# Patient Record
Sex: FEMALE | Race: WHITE | NOT HISPANIC OR LATINO | Employment: OTHER | URBAN - METROPOLITAN AREA
[De-identification: names, ages, dates, MRNs, and addresses within clinical notes are randomized per-mention and may not be internally consistent; named-entity substitution may affect disease eponyms.]

---

## 2017-03-28 ENCOUNTER — ALLSCRIPTS OFFICE VISIT (OUTPATIENT)
Dept: OTHER | Facility: OTHER | Age: 82
End: 2017-03-28

## 2017-03-28 DIAGNOSIS — I35.1 NONRHEUMATIC AORTIC VALVE INSUFFICIENCY: ICD-10-CM

## 2017-04-04 ENCOUNTER — HOSPITAL ENCOUNTER (OUTPATIENT)
Dept: NON INVASIVE DIAGNOSTICS | Facility: HOSPITAL | Age: 82
Discharge: HOME | End: 2017-04-04
Attending: INTERNAL MEDICINE
Payer: MEDICARE

## 2017-04-04 ENCOUNTER — HOSPITAL ENCOUNTER (OUTPATIENT)
Dept: RADIOLOGY | Facility: HOSPITAL | Age: 82
Discharge: HOME | End: 2017-04-04
Attending: INTERNAL MEDICINE
Payer: MEDICARE

## 2017-04-04 DIAGNOSIS — I35.1 NONRHEUMATIC AORTIC VALVE INSUFFICIENCY: ICD-10-CM

## 2017-04-04 PROCEDURE — 93005 ELECTROCARDIOGRAM TRACING: CPT

## 2017-04-04 PROCEDURE — 93306 TTE W/DOPPLER COMPLETE: CPT

## 2017-04-04 PROCEDURE — 93017 CV STRESS TEST TRACING ONLY: CPT

## 2017-04-04 PROCEDURE — 78452 HT MUSCLE IMAGE SPECT MULT: CPT

## 2017-04-04 PROCEDURE — A9502 TC99M TETROFOSMIN: HCPCS

## 2017-04-04 RX ORDER — ASPIRIN 81 MG/1
81 TABLET ORAL DAILY
COMMUNITY
End: 2019-01-29

## 2017-04-04 RX ORDER — MONTELUKAST SODIUM 10 MG/1
10 TABLET ORAL
COMMUNITY
End: 2018-09-14

## 2017-04-04 RX ORDER — PROPRANOLOL HYDROCHLORIDE 10 MG/1
10 TABLET ORAL 3 TIMES DAILY
COMMUNITY
End: 2018-05-22 | Stop reason: ALTCHOICE

## 2017-04-04 RX ORDER — LOSARTAN POTASSIUM 100 MG/1
100 TABLET ORAL DAILY
COMMUNITY

## 2017-04-04 RX ORDER — SIMVASTATIN 10 MG
10 TABLET ORAL
COMMUNITY

## 2017-04-04 RX ORDER — LEVOTHYROXINE SODIUM 88 UG/1
88 TABLET ORAL DAILY
COMMUNITY

## 2017-04-04 RX ADMIN — REGADENOSON 0.4 MG: 0.08 INJECTION, SOLUTION INTRAVENOUS at 09:59

## 2017-04-05 ENCOUNTER — GENERIC CONVERSION - ENCOUNTER (OUTPATIENT)
Dept: OTHER | Facility: OTHER | Age: 82
End: 2017-04-05

## 2017-04-07 LAB
ATRIAL RATE: 57 BPM
ATRIAL RATE: 73 BPM
ATRIAL RATE: 85 BPM
P AXIS: 64 DEGREES
P AXIS: 65 DEGREES
P AXIS: 69 DEGREES
PR INTERVAL: 172 MS
PR INTERVAL: 176 MS
PR INTERVAL: 180 MS
QRS AXIS: -5 DEGREES
QRS AXIS: 27 DEGREES
QRS AXIS: 41 DEGREES
QRSD INTERVAL: 68 MS
QRSD INTERVAL: 70 MS
QRSD INTERVAL: 70 MS
QT INTERVAL: 382 MS
QT INTERVAL: 392 MS
QT INTERVAL: 416 MS
QTC INTERVAL: 404 MS
QTC INTERVAL: 431 MS
QTC INTERVAL: 454 MS
T WAVE AXIS: 44 DEGREES
T WAVE AXIS: 48 DEGREES
T WAVE AXIS: 61 DEGREES
VENTRICULAR RATE: 57 BPM
VENTRICULAR RATE: 73 BPM
VENTRICULAR RATE: 85 BPM

## 2017-07-20 ENCOUNTER — ALLSCRIPTS OFFICE VISIT (OUTPATIENT)
Dept: OTHER | Facility: OTHER | Age: 82
End: 2017-07-20

## 2018-01-03 ENCOUNTER — ALLSCRIPTS OFFICE VISIT (OUTPATIENT)
Dept: OTHER | Facility: OTHER | Age: 83
End: 2018-01-03

## 2018-01-03 ENCOUNTER — TRANSCRIBE ORDERS (OUTPATIENT)
Dept: ADMINISTRATIVE | Facility: HOSPITAL | Age: 83
End: 2018-01-03

## 2018-01-03 DIAGNOSIS — I35.1 AORTIC VALVE INSUFFICIENCY, ETIOLOGY OF CARDIAC VALVE DISEASE UNSPECIFIED: ICD-10-CM

## 2018-01-03 DIAGNOSIS — R00.2 PALPITATIONS: ICD-10-CM

## 2018-01-03 DIAGNOSIS — I35.1 NONRHEUMATIC AORTIC VALVE INSUFFICIENCY: ICD-10-CM

## 2018-01-03 DIAGNOSIS — R00.2 PALPITATIONS: Primary | ICD-10-CM

## 2018-01-04 NOTE — PROGRESS NOTES
Assessment   Assessed    1  Moderate aortic regurgitation (424 1) (I35 1)   2  Dyslipidemia (272 4) (E78 5)   3  Benign essential hypertension (401 1) (I10)   4  Hypothyroidism (244 9) (E03 9)   5  Dyspnea on exertion (786 09) (R06 09)   6  Heart palpitations (785 1) (R00 2)   7  Sinus bradycardia (427 89) (R00 1)    Plan   Benign essential hypertension    · EKG/ECG- POC; Status:Complete;   Done: 22QVH2710   Perform: In Office; IRT:84OQV2768; Last Updated By:Jl Tatum; 1/3/2018 10:29:35 AM;Ordered; For:Benign essential hypertension; Ordered By:Chad Brar;  Benign essential hypertension, Dyslipidemia, Heart palpitations, Moderate aortic    regurgitation    · Follow-up visit in 4 Months Evaluation and Treatment  Follow-up  Status: Complete     Done: 37YKQ0296   Ordered; For: Benign essential hypertension, Dyslipidemia, Heart palpitations, Moderate aortic regurgitation; Ordered By: Toyin Romero Performed:  Due: 44XHF4502; Last Updated By: Shayna Barcenas; 1/3/2018 3:11:30 PM  Heart palpitations, Moderate aortic regurgitation    · HOLTER MONITOR - 48 HOUR; Status:Active; Requested NAK:[id number]; Perform:[facility]; RUF:[id number]; Last Updated By:Deisy Barrett; 1/3/2018 11:11:09 AM;Ordered;For:Heart palpitations, Moderate aortic regurgitation; Ordered By:Chad Brar; Moderate aortic regurgitation    · ECHO COMPLETE WITH CONTRAST IF INDICATED; Status:Need Information - Financial    Authorization; Requested GJQ:[id number]; Perform:[facility]; YUW:[id number]; Last Updated By:Deisy Barrett; 1/3/2018 11:11:09 AM;Ordered; For:Moderate aortic regurgitation; Ordered By:Chad Brar;    Discussion/Summary   Cardiology Discussion Summary Free Text Note Form [facility]:    1  Moderate aortic regurgitation with dilated LV on last echo  Echo this time repeat shows normal LV systolic function with moderate AI but no change from old ECHO  LV cavity appears to be upper limit of normal size  Will repeat echo Doppler Dyspnea on exertion  Not changed from old symptoms  Most likely is deconditioning in her age  Less likely to be cardiac in present in view of nonischemic nuclear and normal LV systolic function by echo  Dyslipidemia  Continue statins patient has been tolerating simvastatin at a low dose pretty well  Will continue that Hypertension  Blood pressures well controlled with current therapy  Hypothyroidism  On levothyroxine Occasional palpitations and sinus bradycardia  Patient has been on propranolol 20 mg twice a day  We will continue that as she is tolerating it very well  Patient now agree for Holter monitor  Will check 48 hour Holter monitor will follow up in 4 months  Further plan as also of Holter and echo becomes available  Continue same med medications  She is tolerating her statin also very well  Goals and Barriers: The patient has the current Goals: Wants to keep herself more active  The patent has the current Barriers: None other than arthritis  Patient's Capacity to Self-Care: Patient is able to Self-Care  Medication SE Review and Pt Understands Tx: Possible side effects of new medications were reviewed with the patient/guardian today  Counseling Documentation With Imm: The patient, patient's family was counseled regarding diagnostic results,-- instructions for management,-- risk factor reductions,-- prognosis,-- patient and family education,-- impressions,-- risks and benefits of treatment options,-- importance of compliance with treatment  Chief Complaint   Chief Complaint Free Text Note Form: PT is here for a 6 month F/U, no cardiac complaints at this time  Chief Complaint Chronic Condition [facility]: Patient is here today for follow up of chronic conditions described in HPI        History of Present Illness   Cardiology HPI Free Text Note Form [facility]: 42-year-old woman with a past medical history significant for hypertension, dyslipidemia, hypothyroidism, history of pulmonary embolism with DVT in 2010, breast cancer treated with partial mastectomy in 2003, hyperthyroidism, total abdominal hysterectomy, DJD who has been seeing gas for the last 3-4 years for hypertension  Her previous echo has shown moderate aortic insufficiency, she cannot walk on the treadmill  came for follow-up for her hypertension, dyslipidemia, hypothyroidism  She does have history of pulmonary embolism with DVT in 2010  She is doing well  Denies any chest pain or any shortness of breath  Her echo shows no significant change from old echo  Nuclear stress test was no ischemia  She has no PND no orthopnea and no other significant complaint  She occasionally gets palpitations  She denies any symptoms of dizziness lightheadedness  She feels she missed a beat  Reluctant to do any Holter monitor as she has it for many years and has been worked up in the past  reviewed  Patient came for follow-up for her moderate aortic regurgitation  She has multiple other medical problems  She is feeling well  Denies any chest pain or any shortness of breath  Workup on last echo shows her LV was upper limit of normal size with moderate AI  She remains bradycardic heart rate around 50 beats per minute  Denies any history of syncope  No dizziness no lightheadedness no other significant complaint  Review of Systems   Cardiology Female ROS:         Cardiac: rhythm problems-- and-- has heart murmur present, but-- as noted in HPI--   Dyspnea on exertion  Skin: No complaints of nonhealing sores or skin rash  Genitourinary: No complaints of recurrent urinary tract infections, frequent urination at night, difficult urination, blood in urine, kidney stones, loss of bladder control, kidney problems, denies any birth control or hormone replacement, is not post menopausal, not currently pregnant  Psychological: No complaints of feeling depressed, anxiety, panic attacks, or difficulty concentrating        General: No complaints of trouble sleeping, lack of energy, fatigue, appetite changes, weight changes, fever, frequent infections, or night sweats  Respiratory: No complaints of shortness of breath, cough with sputum, or wheezing  HEENT: No complaints of serious problems, hearing problems, nose problems, throat problems, or snoring  Gastrointestinal: No complaints of liver problems, nausea, vomiting, heartburn, constipation, bloody stools, diarrhea, problems swallowing, adbominal pain, or rectal bleeding  Hematologic: No complaints of bleeding disorders, anemia, blood clots, or excessive brusing  Neurological: No complaints of numbness, tingling, dizziness, weakness, seizures, headaches, syncope or fainting, AM fatigue, daytime sleepiness, no witnessed apnea episodes  Musculoskeletal: No complaints of arthritis, back pain, or painfull swelling  ROS Reviewed:    ROS reviewed  Active Problems   Problems    1  Benign essential hypertension (401 1) (I10)   2  Dyslipidemia (272 4) (E78 5)   3  Dyspnea on exertion (786 09) (R06 09)   4  Heart palpitations (785 1) (R00 2)   5  Hypothyroidism (244 9) (E03 9)   6  Leg swelling (729 81) (M79 89)   7  Moderate aortic regurgitation (424 1) (I35 1)   8  Spider varicose veins (454 9) (I86 8)    Past Medical History   Problems    1  History of malignant neoplasm of breast (V10 3) (Z85 3)  Active Problems And Past Medical History Reviewed: The active problems and past medical history were reviewed and updated today  Surgical History   Problems    1  History of Breast Surgery Lumpectomy   2  History of Hysterectomy   3  History of Knee Arthroscopy (Therapeutic)  Surgical History Reviewed: The surgical history was reviewed and updated today  Family History   Mother    1  Family history of malignant neoplasm (V16 9) (Z80 9)  Father    2  Family history of cardiac disorder (V17 49) (Z82 49)  Family History Reviewed:     The family history was reviewed and updated today  Social History   Problems    · Never a smoker   · No alcohol use   · No drug use  Social History Reviewed: The social history was reviewed and updated today  Current Meds    1  Adacel 5-2-15 5 LF-MCG/0 5 SUSP; Therapy: 07OWE3950 to Recorded   2  Aspirin 81 MG Oral Tablet Delayed Release; Take 1 tablet daily; Therapy: 63AZR6085 to (Last Rx:28Mar2017) Ordered   3  Clindamycin HCl - 150 MG Oral Capsule; Therapy: 19EUH9069 to Recorded   4  Ibuprofen 800 MG Oral Tablet; Therapy: 54GJP6500 to Recorded   5  Levothyroxine Sodium 88 MCG Oral Tablet; Therapy: 02Jun2016 to Recorded   6  Losartan Potassium 100 MG Oral Tablet; TAKE 1 TABLET DAILY; Therapy: 16QKE8364 to Recorded   7  Montelukast Sodium 10 MG Oral Tablet; Therapy: 02Jun2016 to Recorded   8  Ofloxacin 0 3 % Ophthalmic Solution; Therapy: 20Gdh5501 to Recorded   9  PrednisoLONE Acetate 1 % Ophthalmic Suspension; Therapy: 06Ysb7161 to Recorded   10  Propranolol HCl - 20 MG Oral Tablet; TAKE 1 TABLET TWICE DAILY; Therapy: 59UXS0982 to Recorded   11  Simvastatin 10 MG Oral Tablet; Therapy: 63VBK9643 to Recorded  Medication List Reviewed: The medication list was reviewed and updated today  Allergies   Medication    1  No Known Drug Allergies  Non-Medication    2  Adhesive Tape    Vitals   Vital Signs    Recorded: 22MUD4489 10:15AM   Heart Rate 51, Apical   Systolic 306, RUE, Sitting   Diastolic 76, RUE, Sitting   Height 5 ft 3 in   Weight 215 lb 6 oz   BMI Calculated 38 15   BSA Calculated 2   O2 Saturation 98, RA     Physical Exam        Constitutional      General appearance: No acute distress, well appearing and well nourished  -- Alert awake and  Eyes      Conjunctiva and Sclera examination: Conjunctiva pink, sclera anicteric         Ears, Nose, Mouth, and Throat - Oropharynx: Clear, nares are clear, mucous membranes are moist       Neck      Neck and thyroid: Normal, supple, trachea midline, no thyromegaly  -- No JVP  Pulmonary      Respiratory effort: No increased work of breathing or signs of respiratory distress  -- Normal effort  Auscultation of lungs: Clear to auscultation, no rales, no rhonchi, no wheezing, good air movement  -- Clear to auscultation  Cardiovascular      Auscultation of heart: Normal rate and rhythm, normal S1 and S2, no murmurs  -- S1-S2 regular bradycardic with a crescendo decrescendo diastolic murmur  [place] apical area  Carotid pulses: Normal, 2+ bilaterally  Peripheral vascular exam: Normal pulses throughout, no tenderness, erythema or swelling  Pedal pulses: Normal, 2+ bilaterally  Examination of extremities for edema and/or varicosities: Normal        Abdomen      Abdomen: Non-tender and no distention  Liver and spleen: No hepatomegaly or splenomegaly  Musculoskeletal Gait and station: Normal gait  -- Digits and nails: Normal without clubbing or cyanosis  -- Inspection/palpation of joints, bones, and muscles: Normal, ROM normal        Skin - Skin and subcutaneous tissue: Normal without rashes or lesions  Skin is warm and well perfused, normal turgor  Neurologic - Cranial nerves: II - XII intact  -- Speech: Normal        Psychiatric - Orientation to person, place, and time: Normal -- Mood and affect: Normal       Results/Data   Diagnostic Studies Reviewed Cardio: I personally reviewed the recording/images in the office today  My interpretation follows  Stress Test: Stress echocardiogram done in 2014 by a different cardiologist shows normal global function, no segment wall motion abnormality, EF was normal however the study was limited and patient could not get 85% maximum predicted heart rate due to beta blockade stress test done in April 2017 shows no evidence of ischemia   Mild fixed defect EF was normal       Echocardiogram/NIKIA: There is no recent echo, echo done in 2014 shows EF 57%, moderately dilated LV and moderately dilated LA, mild MR, mild-to-moderate aortic regurgitation Doppler done in April 2017 shows normal LV systolic function, grade 2 diastolic dysfunction, moderate AI, mild MR, moderately dilated left atrium, mild-to-moderate TR with PA pressure around 30 mmHg  ECG Report:      Comparison to prior ECGs: no interval change  Twelve-lead EKG done in April 2016 shows normal sinus rhythm heart rate 63 bpm  EKG done on 3/28/2017 shows normal sinus stump  Heart rate 57 bpm  Nonspecific ST changes  No change from old EKG lead EKG shows sinus bradycardia heart rate 51 beats per minute   No significant change from old EKG      Signatures    Electronically signed by : ERASTO Jolly ; Rob  3 2018  5:36PM EST                       (Author)

## 2018-01-09 NOTE — PROGRESS NOTES
Assessment    1  Leg swelling (729 81) (M79 89)   2  Spider varicose veins (454 9) (I86 8)    Plan    1  Gradient compression stocking, below knee, 20-30mm Hg, each; Status:Complete;     Done: 20ALE0679   2  Begin or continue regular aerobic exercise  Gradually work up to at least count1   sessions of dur1 of exercise a week ; Status:Complete;   Done: 87YWO5192   3  Keep your leg elevated whenever you can to decrease swelling and increase circulation ;   Status:Complete;   Done: 30TJO9589   4  Support stockings can help keep the blood from pooling in the small veins in your feet   and legs ; Status:Complete;   Done: 04DYP6671   5  We recommend that you bring your body mass index down to 26 ; Status:Complete;     Done: 02OLC3085    Discussion/Summary  Discussion Summary:   Bilateral lower extremity swelling with spider and reticular varicosities  We discussed the pathophysiology of venous disease and the indications for further evaluation treatment  We discussed the options of continued conservative management with exercise, elevation, compression and maintenance of a healthy weight  At this time continued conservative management will be pursued and if there is any progression further evaluation will be performed at that time which would include duplex evaluation for reflux  A new prescription for compressive stockings was given  Chief Complaint  Chief Complaint Free Text Note Form: " I need my varicose veins evaluated " No testing  Gregorio Morris is new to our practice, She was referred by Dr Gallito Segura  History of Present Illness  Free Text HPI: 53-year-old presents with varicose veins affecting bilateral lower extremities  She is noticed varicose veins for the past 40 years but notes they have become more prominent recently  She denies any specific discomfort at the site of varicosities but does note heaviness and fatigue sensation in her legs after standing for prolonged periods of time   This is relieved with elevation  She does not currently utilize compressive stockings  She feels they make the discomfort worse  She does have a history of DVT 5-6 years ago with pulmonary embolus wall on tamoxifen for treatment of breast cancer  She was treated with anticoagulation for one year  She has had no recurrence or previous episodes of superficial or deep vein thrombosis  She is here today to have her varicose veins evaluated  She states that she has bilateral varicose veins & spider veins, she denies any pain , tenderness, numbness & tingling  She states that after being on her feet all day they feel very heavy & her ankles will swell  She does not wear compression stocking  She has a history of L DVT & PE in 2010 she was on warfarin for a year  She does take a baby aspirin daily  Review of Systems  Complete Female - Vasc:   Constitutional: No fever or chills, feels well, no tiredness, no recent weight gain or weight loss  Eyes: No sudden vision loss, no blurred vision, no double vision  ENT: hoarseness, but no earache, no nosebleeds, no sore throat, no hearing loss and no nasal discharge  Cardiovascular: no chest pain, regular heart rate  Respiratory: no wheezing, no cough, no orthopnea and no complaints of PND, but shortness of breath and shortness of breath during exertion  Gastrointestinal: No nausea, No vomiting, no diarrhea, no blood in stool  Genitourinary: no dysuria, no Hematuria,no urinary incontinence  Musculoskeletal: joint stiffness, but lumbar pain, no joint swelling, no limb pain, no myalgias and no limb swelling  Integumentary: no rash, no lesions, no wounds, no ulcer  Neurological: no dementia and difficulty walking, but no headache, no numbness, no confusion, no dizziness, no limb weakness, no convulsions and no fainting  Psychiatric: no depression, no mood disorders, no anxiety  Hematologic/Lymphatic: no bleeding disorder, no easy bruising  ROS Reviewed:   ROS reviewed  Past Medical History    1  History of malignant neoplasm of breast (V10 3) (Z85 3)  Active Problems And Past Medical History Reviewed: The active problems and past medical history were reviewed and updated today  Surgical History  Surgical History Reviewed: The surgical history was reviewed and updated today  Family History  Family History Reviewed: The family history was reviewed and updated today  Social History    · Never a smoker   · No alcohol use   · No drug use  Social History Reviewed: The social history was reviewed and updated today  Current Meds  Medication List Reviewed: The medication list was reviewed and updated today  Allergies    1  No Known Drug Allergies    2  Adhesive Tape    Vitals  Vital Signs [Data Includes: Current Encounter]    Recorded: 42Eqb4706 11:13AM   Heart Rate 70, R Radial   Pulse Quality Normal, R Radial   Respiration 16   Respiration Quality Normal   Systolic 318, RUE, Sitting   Diastolic 68, RUE, Sitting   Height 5 ft 3 in   Weight 207 lb    BMI Calculated 36 67   BSA Calculated 1 96     Physical Exam    Radial: right 2+ and left 2+  Dorsalis pedis: right 2+ and left 2+  Distal Pulse Exam: Normal Capillary Refill  Extremities: bilateral ankle pitting edema, bilateral pretibial pitting edema and no foot edema  LE Varicose Veins: no right leg truncal veins, no left leg truncal veins, right leg reticular veins, left leg reticular veins, right leg spider veins and left leg spider veins  The heart rate was normal  The rhythm was regular  Murmurs: No murmurs were heard  Pulmonary   Respiratory effort: No increased work of breathing or signs of respiratory distress  Auscultation of lungs: Clear to auscultation  No wheezing, no rales, no rhonchi  Psychiatric   Orientation to person, place and time: Normal    Mood and affect: Normal    Neurologic Sensory exam normal   Motor skills intact     Musculoskeletal   Gait and station: Normal  Skin   Skin and subcutaneous tissue: Normal without rashes or lesions  Palpation of skin and subcutaneous tissue: Normal turgor  Venous Disease: Spider and reticular varicosities throughout bilateral lower extremities with a cluster in the popliteal fossa        Signatures   Electronically signed by : Emanuel Belle MD; Jul 14 2016 11:54AM EST                       (Author)

## 2018-01-09 NOTE — RESULT NOTES
Message   Echo is okay  Valve leak is not worse  Verified Results  ECHO COMPLETE WITH CONTRAST IF INDICATED 2017 07:44AM Aleksandar Carranza Order Number: [id number]    - Patient Instructions: To schedule this appointment, please contact Central Scheduling at [phone]  Test Name Result Flag Reference   ECHO COMPLETE WITH CONTRAST IF INDICATED (Report)     [address]   [address] [facility]   [place] [address]   [phone]     Transthoracic Echocardiogram   2D, M-mode, Doppler, and Color Doppler     Study date: 2017     Patient: Robert Avendano   MR number: [id number]   Account number: [de-identified]   : 1935   Age: 80 years   Gender: Female   Status: Routine   Location: Echo lab   Height: 63 in   Weight: 213 6 lb   BP: 126/ 74 mmHg     Indications: Aortic Valve Disease     Diagnoses: 424 1 - AORTIC VALVE DISORDER     Sonographer: Bucky Olivares   Primary Physician: Hailey Contreras MD   Referring Physician: Gaston Robertson MD   Group: [facility]   Interpreting Physician: DO MUSHTAQ Castro     LEFT VENTRICLE:   Systolic function was normal by visual assessment  Ejection fraction was estimated in the range of 55 % to 60 %  There were no regional wall motion abnormalities  There was mild concentric hypertrophy  Features were consistent with a pseudonormal left ventricular filling pattern, with concomitant abnormal relaxation and increased filling pressure (grade 2 diastolic dysfunction)  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  LEFT ATRIUM:   The atrium was moderately dilated  MITRAL VALVE:   There was mild regurgitation  AORTIC VALVE:   There was no evidence for stenosis  There was moderate regurgitation  TRICUSPID VALVE:   There was moderate regurgitation  Pulmonary artery systolic pressure was within the normal range  Estimated peak PA pressure was 26 mmHg       HISTORY: PRIOR HISTORY: HTN, Dyslipidemia, Hypothyroidism     PROCEDURE: The procedure was performed in the echo lab  This was a routine study  The transthoracic approach was used  The study included complete 2D imaging, M-mode, complete spectral Doppler, and color Doppler  The heart rate was 60 bpm,   at the start of the study  Image quality was adequate  LEFT VENTRICLE: Size was normal  Systolic function was normal by visual assessment  Ejection fraction was estimated in the range of 55 % to 60 %  There were no regional wall motion abnormalities  There was mild concentric hypertrophy  DOPPLER: Features were consistent with a pseudonormal left ventricular filling pattern, with concomitant abnormal relaxation and increased filling pressure (grade 2 diastolic dysfunction)  Findings were consistent with constrictive   pericarditis  There was no evidence of elevated ventricular filling pressure by Doppler parameters  RIGHT VENTRICLE: The size was normal  Systolic function was normal  DOPPLER: Systolic pressure was within the normal range  LEFT ATRIUM: The atrium was moderately dilated  RIGHT ATRIUM: Size was at the upper limits of normal      MITRAL VALVE: Valve structure was normal  There was normal leaflet separation  No echocardiographic evidence for prolapse  DOPPLER: The transmitral velocity was within the normal range  There was no evidence for stenosis  There was mild   regurgitation  AORTIC VALVE: The valve was trileaflet  Leaflets exhibited normal cuspal separation and sclerosis  DOPPLER: Transaortic velocity was within the normal range  There was no evidence for stenosis  There was moderate regurgitation  TRICUSPID VALVE: The valve structure was normal  There was normal leaflet separation  DOPPLER: The transtricuspid velocity was within the normal range  There was moderate regurgitation  Pulmonary artery systolic pressure was within the   normal range  Estimated peak PA pressure was 26 mmHg       PULMONIC VALVE: Leaflets exhibited normal thickness, no calcification, and normal cuspal separation  DOPPLER: The transpulmonic velocity was within the normal range  There was mild regurgitation  PERICARDIUM: There was no thickening  There was no pericardial effusion  AORTA: The root exhibited normal size  PULMONARY ARTERY: The size was normal  The morphology appeared normal      SYSTEM MEASUREMENT TABLES     2D mode   AoR Diam 2D: 3 cm   LA Diam (2D): 4 5 cm   LA/Ao (2D): 1 5   FS (2D Teich): 33 1 %   IVSd (2D): 1 21 cm   LVDEV: 103 cmï¾³   LVESV: 39 4 cmï¾³   LVIDd(2D): 4 71 cm   LVISd (2D): 3 15 cm   LVPWd (2D): 1 31 cm   SV (Teich): 63 6 cmï¾³     Apical four chamber   LVEF A4C: 62 %     Unspecified Scan Mode   MV Peak A [place]: 316 mm/s   MV Peak E [place]  Mean: 871 mm/s   MVA (PHT): 3 28 cm squared   PHT: 67 ms   Max P mm[Hg]   V Max: 2170 mm/s   Vmax: 2150 mm/s   RA Area: 17 8 cm squared   RA Volume: 44 8 cmï¾³   TAPSE: 2 1 cm     [facility] Accredited [facility]     Prepared and electronically signed by     Evi Moore DO   Signed 2017 16:31:50     Addendum Date: 2017 11:49:19 AM   There are no findings to suggest constrictive pericardititis  There was an error in previous report  Please disregard       Addendum entered by: Evi Moore DO

## 2018-01-13 VITALS
DIASTOLIC BLOOD PRESSURE: 70 MMHG | HEIGHT: 63 IN | HEART RATE: 74 BPM | WEIGHT: 213 LBS | BODY MASS INDEX: 37.74 KG/M2 | SYSTOLIC BLOOD PRESSURE: 126 MMHG | OXYGEN SATURATION: 96 %

## 2018-01-13 VITALS
SYSTOLIC BLOOD PRESSURE: 126 MMHG | DIASTOLIC BLOOD PRESSURE: 74 MMHG | BODY MASS INDEX: 37.92 KG/M2 | HEART RATE: 57 BPM | HEIGHT: 63 IN | WEIGHT: 214 LBS

## 2018-01-22 VITALS
SYSTOLIC BLOOD PRESSURE: 122 MMHG | WEIGHT: 215.38 LBS | BODY MASS INDEX: 38.16 KG/M2 | HEIGHT: 63 IN | DIASTOLIC BLOOD PRESSURE: 76 MMHG | HEART RATE: 51 BPM | OXYGEN SATURATION: 98 %

## 2018-02-09 ENCOUNTER — HOSPITAL ENCOUNTER (OUTPATIENT)
Dept: NON INVASIVE DIAGNOSTICS | Facility: HOSPITAL | Age: 83
Discharge: HOME | End: 2018-02-09
Attending: INTERNAL MEDICINE
Payer: MEDICARE

## 2018-02-09 DIAGNOSIS — I35.1 AORTIC VALVE INSUFFICIENCY, ETIOLOGY OF CARDIAC VALVE DISEASE UNSPECIFIED: ICD-10-CM

## 2018-02-09 DIAGNOSIS — I35.1 NONRHEUMATIC AORTIC VALVE INSUFFICIENCY: ICD-10-CM

## 2018-02-09 DIAGNOSIS — R00.2 PALPITATIONS: ICD-10-CM

## 2018-02-09 PROCEDURE — 93306 TTE W/DOPPLER COMPLETE: CPT

## 2018-02-09 PROCEDURE — 93225 XTRNL ECG REC<48 HRS REC: CPT

## 2018-02-09 PROCEDURE — 93226 XTRNL ECG REC<48 HR SCAN A/R: CPT

## 2018-02-10 PROCEDURE — 93306 TTE W/DOPPLER COMPLETE: CPT | Performed by: INTERNAL MEDICINE

## 2018-02-11 NOTE — PROGRESS NOTES
Patient's echo shows normal LV systolic function  Mild-to-moderate AI, mild-to-moderate MR  Above EF to keep an eye on it    Nothing acutely need to be done a he and

## 2018-02-16 PROCEDURE — 93227 XTRNL ECG REC<48 HR R&I: CPT | Performed by: INTERNAL MEDICINE

## 2018-05-19 PROBLEM — E03.9 HYPOTHYROIDISM: Status: ACTIVE | Noted: 2017-03-28

## 2018-05-19 PROBLEM — R00.2 HEART PALPITATIONS: Status: ACTIVE | Noted: 2017-07-20

## 2018-05-19 PROBLEM — R00.1 SINUS BRADYCARDIA: Status: ACTIVE | Noted: 2018-01-03

## 2018-05-19 PROBLEM — R06.00 DYSPNEA ON EXERTION: Status: ACTIVE | Noted: 2017-03-28

## 2018-05-19 PROBLEM — I35.1 MODERATE AORTIC REGURGITATION: Status: ACTIVE | Noted: 2017-03-28

## 2018-05-19 PROBLEM — E78.5 DYSLIPIDEMIA: Status: ACTIVE | Noted: 2017-03-28

## 2018-05-19 PROBLEM — I10 BENIGN ESSENTIAL HYPERTENSION: Status: ACTIVE | Noted: 2017-03-28

## 2018-05-19 NOTE — PROGRESS NOTES
Progress Note - Cardiology Office  John Emanuel [de-identified] y o  female MRN: [id number]  : 1935  Encounter: [id number]      Assessment:     1  Moderate aortic regurgitation    2  Sinus bradycardia    3  Benign essential hypertension    4  Hypothyroidism due to acquired atrophy of thyroid    5  Dyslipidemia    6  Dyspnea on exertion    7  Heart palpitations        Discussion Summary and Plan:  1  Moderate aortic regurgitation with dilated LV on last echo  Echo this time repeat shows normal LV systolic function with moderate AI but no change from old ECHO  LV cavity appears to be upper limit of normal size  Repeat echo from 2018 reviewed with the patient  No significant change  Continue to monitor  2  Dyspnea on exertion  Not changed from old symptoms  Most likely is deconditioning in her age  Less likely to be cardiac in present in view of nonischemic nuclear and normal LV systolic function by echo  Advised to lose weight  Patient is on chronic beta-blocker with slow heart rate may be cause of has some fatigue but she does not want to stop taking it as    3  Dyslipidemia  Continue statins patient has been tolerating simvastatin at a low dose pretty well  Will continue that     4  Hypertension  Blood pressures well controlled with current therapy  5  Hypothyroidism  On levothyroxine     6  Sinus bradycardia  Patient's sinus bradycardia is made worse with propanolol  She is taking it for tremors  She does not function without it  At this time we will continue  Her average heart rate is around 62 beats per minute  Counseling :  A description of the counseling  As above  Patient's ability to self care: Yes  Medication side effect reviewed with patient in detail and all their questions answered to their satisfaction  HPI :     John Emanuel is a [de-identified]y o  year old female who came for follow up   Patient has a past medical history significant for hypertension, dyslipidemia, hypothyroidism, history of pulmonary embolism with DVT in 2010, breast cancer treated with partial mastectomy in 2003, hyperthyroidism, total abdominal hysterectomy, DJD who has been seeing gas for the last 3-4 years for hypertension  Her previous echo has shown moderate aortic insufficiency, she cannot walk on the treadmill    /20/2017 came for follow-up for her hypertension, dyslipidemia, hypothyroidism  She does have history of pulmonary embolism with DVT in 2010  She is doing well  Denies any chest pain or any shortness of breath  Her echo shows no significant change from old echo  Nuclear stress test was no ischemia  She has no PND no orthopnea and no other significant complaint  She occasionally gets palpitations  She denies any symptoms of dizziness lightheadedness  She feels she missed a beat  Reluctant to do any Holter monitor as she has it for many years and has been worked up in the past    /03/2018 reviewed  Patient came for follow-up for her moderate aortic regurgitation  She has multiple other medical problems  She is feeling well  Denies any chest pain or any shortness of breath  Workup on last echo shows her LV was upper limit of normal size with moderate AI  She remains bradycardic heart rate around 50 beats per minute  Denies any history of syncope  No dizziness no lightheadedness no other significant complaint  05/22/2018  Above reviewed  Patient complaining about fatigue and tiredness and shortness of breath  But this is almost same as before  She does have history of tremors and she uses propanolol  Currently her heart rate is 50 beats per minute  She had a 48 hr Holter where average heart rate was around 60 beats per minute  She denies any chest pain  She has a cardiac workup done which included stress test and echo last year  Echo shows mild-to-moderate AI mild-to-moderate MR with normal LV systolic function and grade 2 diastolic dysfunction  No leg swelling, no PND no orthopnea no chest pain  Over winter she has gained about 3-4 lb but no other significant change  Review of Systems   Constitutional: Positive for fatigue  Respiratory: Positive for shortness of breath  Exertional and not changed   Musculoskeletal: Positive for gait problem  All other systems reviewed and are negative  Historical Information   No past medical history on file  No past surgical history on file  History   Alcohol use Not on file     History   Drug use: Unknown     History   Smoking Status    Never Smoker   Smokeless Tobacco    Never Used     Family History: No family history on file  Meds/Allergies     Allergies   Allergen Reactions    Tape  [Medical Tape]        Current Outpatient Prescriptions:     aspirin (ECOTRIN LOW STRENGTH) 81 mg EC tablet, Take 81 mg by mouth daily, Disp: , Rfl:     levothyroxine 88 mcg tablet, Take 88 mcg by mouth daily, Disp: , Rfl:     losartan (COZAAR) 100 MG tablet, Take 100 mg by mouth daily, Disp: , Rfl:     propranolol (INDERAL) 20 mg tablet, Take 20 mg by mouth every 12 (twelve) hours  , Disp: , Rfl:     simvastatin (ZOCOR) 10 mg tablet, Take 10 mg by mouth daily at bedtime, Disp: , Rfl:     azithromycin (ZITHROMAX) 250 mg tablet, , Disp: , Rfl:     clindamycin (CLEOCIN) 150 mg capsule, Take by mouth, Disp: , Rfl:     ibuprofen (MOTRIN) 800 mg tablet, Take by mouth, Disp: , Rfl:     montelukast (SINGULAIR) 10 mg tablet, Take 10 mg by mouth daily at bedtime, Disp: , Rfl:     ofloxacin (OCUFLOX) 0 3 % ophthalmic solution, Apply to eye, Disp: , Rfl:     prednisoLONE acetate (PRED FORTE) 1 % ophthalmic suspension, Apply to eye, Disp: , Rfl:     predniSONE 10 mg tablet, , Disp: , Rfl:     tetanus-diphtheria-acellular pertussis (ADACEL) 5-2-15 5 LF-mcg/0 5 injection, Inject into the shoulder, thigh, or buttocks, Disp: , Rfl:     Vitals: Blood pressure 142/64, pulse (!) 50, resp  rate 16, height 5' 4" (1 626 m), weight 99 kg (218 lb 3 2 oz)      Body mass index is 37 45 kg/m²  Vitals:    05/22/18 1300   Weight: 99 kg (218 lb 3 2 oz)     BP Readings from Last 3 Encounters:   05/22/18 142/64   01/03/18 122/76   07/20/17 126/70         Physical Exam    Neurologic:  Alert & oriented x 3, no new focal deficits, Not in any acute distress,  Constitutional:  Well developed, well nourished, non-toxic appearance   Eyes:  Pupil equal and reacting to light, conjunctiva normal, No JVP, No LNP   HENT:  Atraumatic, oropharynx moist, Neck- normal range of motion, no tenderness,  Neck supple   Respiratory:  Bilateral air entry, mostly clear to auscultation  Cardiovascular: S1-S2 regular with a 3/6 ejection systolic murmur and S4 is present  GI:  Soft, nondistended, normal bowel sounds, nontender, no hepatosplenomegaly appreciated  Musculoskeletal:  No edema, no tenderness, no deformities  Skin:  Well hydrated, no rash   Lymphatic:  No lymphadenopathy noted   Extremities:  No edema and distal pulses are present    Diagnostic Studies Review Cardio:    Stress Test: Stress echocardiogram done in 2014 by a different cardiologist shows normal global function, no segment wall motion abnormality, EF was normal however the study was limited and patient could not get 85% maximum predicted heart rate due to beta blockade stress test done in April 2017 shows no evidence of ischemia  Mild fixed defect EF was normal       Echocardiogram/NIKIA: There is no recent echo, echo done in 2014 shows EF 57%, moderately dilated LV and moderately dilated LA, mild MR, mild-to-moderate aortic regurgitation Doppler done in April 2017 shows normal LV systolic function, grade 2 diastolic dysfunction, moderate AI, mild MR, moderately dilated left atrium, mild-to-moderate TR with PA pressure around 30 mmHg  ECG Report:      Comparison to prior ECGs: no interval change  Twelve-lead EKG done in April 2016 shows normal sinus rhythm heart rate 63 bpm    -lead EKG done on 3/28/2017 shows normal sinus stump   Heart rate 57 bpm  Nonspecific ST changes  No change from old EKG   /2018  lead EKG shows sinus bradycardia heart rate 51 beats per minute  No significant change from old EKG     2018 12 lead EKG shows sinus bradycardia heart rate 50 beats per minute  No significant ST changes  Cardiac testing:   Results for orders placed during the hospital encounter of 18   Echo complete with contrast if indicated    Narrative [address] [place] [address]  Paula Leone   [phone]    Transthoracic Echocardiogram  2D, M-mode, Doppler, and Color Doppler    Study date:  2018    Patient: Jimbo Jung  MR number: [id number]  Account number: [de-identified]  : 1935  Age: [de-identified] years  Gender: Female  Status: Routine  Location: Echo lab  Height: 63 in  Weight: 214 5 lb  BP: 122/ 74 mmHg    Indications: Palpitations    Diagnoses: R00 2 - Palpitations    Sonographer:  VITOR Smith  Primary Physician:  Shira Raines MD  Referring Physician:  See Strauss MD  Group:  [facility] [id number] [facility]  Interpreting Physician:  See Strauss MD    SUMMARY    LEFT VENTRICLE:  Systolic function was normal  Ejection fraction was estimated in the range of 55 % to 60 % to be 55 %  There were no regional wall motion abnormalities  Wall thickness was mildly increased  There was mild concentric hypertrophy  Features were consistent with a pseudonormal left ventricular filling pattern, with concomitant abnormal relaxation and increased filling pressure (grade 2 diastolic dysfunction)  LEFT ATRIUM:  The atrium was mildly to moderately dilated  RIGHT ATRIUM:  The atrium was mildly dilated  MITRAL VALVE:  There was mild to moderate regurgitation  AORTIC VALVE:  There was mild to moderate regurgitation   mec    TRICUSPID VALVE:  There was mild regurgitation  Estimated peak PA pressure was 35 mmHg      IVC, HEPATIC VEINS:  The inferior vena cava was mildly dilated  Respirophasic changes were blunted (less than 50% variation)  HISTORY: PRIOR HISTORY: HTN, Dyslipidemia, Hypothyroidism    PROCEDURE: The procedure was performed in the echo lab  This was a routine study  The transthoracic approach was used  The study included complete 2D imaging, M-mode, complete spectral Doppler, and color Doppler  The heart rate was 60 bpm,  at the start of the study  Image quality was adequate  LEFT VENTRICLE: Size was normal  Systolic function was normal  Ejection fraction was estimated in the range of 55 % to 60 % to be 55 %  There were no regional wall motion abnormalities  Wall thickness was mildly increased  There was mild  concentric hypertrophy  DOPPLER: Features were consistent with a pseudonormal left ventricular filling pattern, with concomitant abnormal relaxation and increased filling pressure (grade 2 diastolic dysfunction)  RIGHT VENTRICLE: The size was normal  Systolic function was normal     LEFT ATRIUM: The atrium was mildly to moderately dilated  RIGHT ATRIUM: The atrium was mildly dilated  MITRAL VALVE: There was normal leaflet separation  DOPPLER: The transmitral velocity was within the normal range  There was no evidence for stenosis  There was mild to moderate regurgitation  AORTIC VALVE: The valve was trileaflet  Leaflets exhibited mildly increased thickness and normal cuspal separation  DOPPLER: Transaortic velocity was within the normal range  There was no evidence for stenosis  There was mild to moderate  regurgitation   mec    TRICUSPID VALVE: DOPPLER: There was mild regurgitation  Estimated peak PA pressure was 35 mmHg  PULMONIC VALVE: DOPPLER: There was mild regurgitation  PERICARDIUM: There was no thickening or calcification  There was no pericardial effusion  AORTA: The root exhibited normal size  SYSTEMIC VEINS: IVC: The inferior vena cava was mildly dilated   Respirophasic changes were blunted (less than 50% variation)  SYSTEM MEASUREMENT TABLES    2D mode  AoR Diam 2D: 2 8 cm  LA Diam (2D): 5 cm  LA/Ao (2D): 1 79  FS (2D Teich): 31 7 %  IVSd (2D): 1 01 cm  LVDEV: 120 cm³  LVESV: 48 8 cm³  LVIDd(2D): 5 04 cm  LVISd (2D): 3 44 cm  LVPWd (2D): 1 01 cm  SV (Teich): 71 2 cm³    Apical four chamber  LVEF A4C: 57 %    Unspecified Scan Mode  MV Peak A Franklyn: 223 mm/s  MV Peak E Franklyn  Mean: 891 mm/s  MVA (PHT): 3 38 cm squared  PHT: 65 ms  Max P mm[Hg]  V Max: 2690 mm/s  Vmax: 2550 mm/s  RA Area: 20 9 cm squared  RA Volume: 56 cm³  TAPSE: 2 2 cm    [facility] Accredited [facility]    Prepared and electronically signed by    Nat Valles MD  Signed 10-Feb-2018 13:54:54         Results for orders placed during the hospital encounter of 17   NM myocardial perfusion spect (rx stress and/or rest)    [facility]  [address] [address]  [phone]    Rest/Stress Gated SPECT Myocardial Perfusion Imaging After Regadenoson    Patient: LILLIE WRIGHT  MR number: [id number]  Account number: [de-identified]  : 1935  Age: 80 years  Gender: Female  Status: Outpatient  Location: Stress lab  Height: 63 in  Weight: 214 lb  BP: 152/ 61 mmHg    Allergies: ALLERGIES NOT ON FILE    Diagnosis: 782 3 - EDEMA, R06 00 - Dyspnea, unspecified    Primary Physician:  Adrienne Brittle, MD  RN:  AGUSTINA Ledesma  Referring Physician:  Nat Valles MD  Group:  [facility] Dickson  Report Prepared By[de-identified]  AGUSTINA Ledesma  Interpreting Physician:  Riky Prince DO    INDICATIONS: Detection of coronary artery disease  HISTORY: The patient is a 80year old  female  Chest pain status: no chest pain  Other symptoms: dyspnea and edema  Coronary artery disease risk factors: dyslipidemia and hypertension  Cardiovascular history: Pulmonary Emboli-DVT  Co-morbidity: obesity   Medications: a beta blocker, aspirin, a lipid lowering agent, an antihypertensive agent, and thyroid medications  Previous test results: abnormal resting echocardiogram     PHYSICAL EXAM: Baseline physical exam screening: no wheezes audible  REST ECG: Sinus bradycardia  PROCEDURE: The study was performed in the stress lab  A regadenoson infusion pharmacologic stress test was performed  Gated SPECT myocardial perfusion imaging was performed after stress and at rest  Systolic blood pressure was 152 mmHg, at  the start of the study  Diastolic blood pressure was 61 mmHg, at the start of the study  The heart rate was 58 bpm, at the start of the study  IV double checked  Regadenoson protocol:  Time HR bpm SBP mmHg DBP mmHg Symptoms Rhythm/conduct  Baseline 09:54 57 152 61 none sinus petar  Immediate 09:59 85 154 68 mild dyspnea NSR, occasional PVC's  1 min 10:00 84 159 77 same as above --  2 min 10:01 77 169 72 subsiding --  3 min 10:02 73 148 86 none --  No medications or fluids given  STRESS SUMMARY: Duration of pharmacologic stress was 3 min  Maximal heart rate during stress was 85 bpm  The heart rate response to stress was normal  There was resting hypertension with an appropriate blood pressure response to stress  The rate-pressure product for the peak heart rate and blood pressure was 59688  There was no chest pain during stress  The stress test was terminated due to protocol completion  Pre oxygen saturation: 92 %  Peak oxygen saturation: 94 %  The stress ECG was negative for ischemia and normal  There were no stress arrhythmias or conduction abnormalities  ISOTOPE ADMINISTRATION:  Resting isotope administration Stress isotope administration  Agent Tetrofosmin Tetrofosmin  Dose 10 5 mCi 31 7 mCi  Date 04/04/2017 04/04/2017    The radiopharmaceutical was injected at the peak effect of pharmacologic stress  MYOCARDIAL PERFUSION IMAGING:  The image quality was good  Rotating projection images reveal breast attenuation      PERFUSION DEFECTS:  -  There was a small, mildly severe, fixed myocardial perfusion defect of the anterior wall possibly due to attenuation from breast tissue  GATED SPECT:  The calculated left ventricular ejection fraction was 73 %  There was no left ventricular regional abnormality  SUMMARY:  -  Stress results: There was resting hypertension with an appropriate blood pressure response to stress  There was no chest pain during stress  -  ECG conclusions: The stress ECG was negative for ischemia and normal   -  Perfusion imaging: There was a small, mildly severe, fixed myocardial perfusion defect of the anterior wall possibly due to attenuation from breast tissue   -  Gated SPECT: The calculated left ventricular ejection fraction was 73 %  There was no left ventricular regional abnormality  IMPRESSIONS: Abnormal study after pharmacologic stress  There was a small infarct in the anterior region that may be due to breast attenuation artifact  Left ventricular systolic function was normal     Prepared and signed by    Marivel Mg DO  Signed 04/04/2017 16:47:37           Dr Matheus Rosario MD [facility]      "This note has been constructed using a voice recognition system  Therefore there may be syntax, spelling, and/or grammatical errors   Please call if you have any questions  "

## 2018-05-22 ENCOUNTER — OFFICE VISIT (OUTPATIENT)
Dept: CARDIOLOGY CLINIC | Facility: CLINIC | Age: 83
End: 2018-05-22
Payer: MEDICARE

## 2018-05-22 VITALS
HEIGHT: 64 IN | RESPIRATION RATE: 16 BRPM | BODY MASS INDEX: 37.25 KG/M2 | SYSTOLIC BLOOD PRESSURE: 142 MMHG | WEIGHT: 218.2 LBS | DIASTOLIC BLOOD PRESSURE: 64 MMHG | HEART RATE: 50 BPM

## 2018-05-22 DIAGNOSIS — E03.4 HYPOTHYROIDISM DUE TO ACQUIRED ATROPHY OF THYROID: ICD-10-CM

## 2018-05-22 DIAGNOSIS — E78.5 DYSLIPIDEMIA: ICD-10-CM

## 2018-05-22 DIAGNOSIS — I35.1 MODERATE AORTIC REGURGITATION: ICD-10-CM

## 2018-05-22 DIAGNOSIS — R00.1 SINUS BRADYCARDIA: ICD-10-CM

## 2018-05-22 DIAGNOSIS — I10 BENIGN ESSENTIAL HYPERTENSION: ICD-10-CM

## 2018-05-22 DIAGNOSIS — R06.00 DYSPNEA ON EXERTION: ICD-10-CM

## 2018-05-22 DIAGNOSIS — R00.2 HEART PALPITATIONS: ICD-10-CM

## 2018-05-22 PROCEDURE — 99214 OFFICE O/P EST MOD 30 MIN: CPT | Performed by: INTERNAL MEDICINE

## 2018-05-22 PROCEDURE — 93000 ELECTROCARDIOGRAM COMPLETE: CPT | Performed by: INTERNAL MEDICINE

## 2018-05-22 RX ORDER — OFLOXACIN 3 MG/ML
SOLUTION/ DROPS OPHTHALMIC
COMMUNITY
Start: 2017-12-22 | End: 2018-09-14

## 2018-05-22 RX ORDER — PROPRANOLOL HYDROCHLORIDE 20 MG/1
20 TABLET ORAL EVERY 12 HOURS SCHEDULED
COMMUNITY
Start: 2018-05-16

## 2018-05-22 RX ORDER — IBUPROFEN 800 MG/1
TABLET ORAL
COMMUNITY
Start: 2016-06-14 | End: 2019-01-29

## 2018-05-22 RX ORDER — CLINDAMYCIN HYDROCHLORIDE 150 MG/1
CAPSULE ORAL
COMMUNITY
Start: 2016-06-01 | End: 2018-09-14

## 2018-05-22 RX ORDER — PREDNISOLONE ACETATE 10 MG/ML
SUSPENSION/ DROPS OPHTHALMIC
COMMUNITY
Start: 2017-12-22 | End: 2018-09-14

## 2018-05-22 RX ORDER — PREDNISONE 10 MG/1
TABLET ORAL
COMMUNITY
Start: 2018-02-24 | End: 2018-09-14

## 2018-05-22 RX ORDER — AZITHROMYCIN 250 MG/1
TABLET, FILM COATED ORAL
COMMUNITY
Start: 2018-02-24 | End: 2018-09-14

## 2018-09-09 NOTE — PROGRESS NOTES
Progress Note - Cardiology Office  Rajat Perez 82 y o  female MRN: [id number]  : 1935  Encounter: [id number]      Assessment:     1  Moderate aortic regurgitation    2  Benign essential hypertension    3  Hypothyroidism due to acquired atrophy of thyroid    4  Sinus bradycardia    5  Dyslipidemia    6  Heart palpitations    7  Dyspnea on exertion    8  Status post fall        Discussion Summary and Plan:  1  Moderate aortic regurgitation with dilated LV on last echo  Echo this time repeat shows normal LV systolic function with moderate AI but no change from old ECHO  LV cavity appears to be upper limit of normal size  Repeat echo from 2018 reviewed with the patient  No significant change  Continue to monitor  Currently patient has no symptoms  May need repeat echo early next year  2  Dyspnea on exertion  Not changed from old symptoms  Most likely is deconditioning in her age  Less likely to be cardiac in present in view of nonischemic nuclear and normal LV systolic function by echo  Advised to lose weight  3  Dyslipidemia  Continue statins patient has been tolerating simvastatin at a low dose pretty well  She is scheduled to see PMD regarding further blood test    4  Hypertension  Blood pressures well controlled with current therapy  She is taking losartan 100 mg daily along with some propanolol  5  Hypothyroidism  On levothyroxine     6  Sinus bradycardia  Patient's sinus bradycardia is made worse with propanolol  She is taking it for tremors  She does not function without it  At this time we will continue  Her average heart rate is around 62 beats per minute  7   Status post fall  She broke her left humerus  Follow up with Orthopedics  Currently on conservative Rx  Counseling :  A description of the counseling    As above  Patient's ability to self care: Yes  Medication side effect reviewed with patient in detail and all their questions answered to their satisfaction  HPI :     Herson Mccarthy is a 82y o  year old female who came for follow up  Patient has a past medical history significant for hypertension, dyslipidemia, hypothyroidism, history of pulmonary embolism with DVT in 2010, breast cancer treated with partial mastectomy in 2003, hyperthyroidism, total abdominal hysterectomy, DJD who has been seeing gas for the last 3-4 years for hypertension  Her previous echo has shown moderate aortic insufficiency, she cannot walk on the treadmill    /20/2017 came for follow-up for her hypertension, dyslipidemia, hypothyroidism  She does have history of pulmonary embolism with DVT in 2010  She is doing well  Denies any chest pain or any shortness of breath  Her echo shows no significant change from old echo  Nuclear stress test was no ischemia  She has no PND no orthopnea and no other significant complaint  She occasionally gets palpitations  She denies any symptoms of dizziness lightheadedness  She feels she missed a beat  Reluctant to do any Holter monitor as she has it for many years and has been worked up in the past        05/22/2018  Above reviewed  Patient complaining about fatigue and tiredness and shortness of breath  But this is almost same as before  She does have history of tremors and she uses propanolol  Currently her heart rate is 50 beats per minute  She had a 48 hr Holter where average heart rate was around 60 beats per minute  She denies any chest pain  She has a cardiac workup done which included stress test and echo last year  Echo shows mild-to-moderate AI mild-to-moderate MR with normal LV systolic function and grade 2 diastolic dysfunction  No leg swelling, no PND no orthopnea no chest pain  Over winter she has gained about 3-4 lb but no other significant change  09/14/2018  Above reviewed  Unfortunately patient had a fall  She broke her left humerus wound at multiple sites  Currently she is in conservative Rx    She has followed up with Orthopedics  She was walking out of the [employer] store when she fell  She had no episodes of chest pain shortness breath dizziness lightheadedness or palpitation at that time  It was raining that day and floor was wet  No leg swelling no nausea no vomiting  No other significant issues  Review of Systems   Constitutional: Positive for fatigue  Respiratory: Negative  Genitourinary: Negative  Musculoskeletal: Positive for arthralgias and gait problem  Status post fall and  Left humerus fracture   Psychiatric/Behavioral: The patient is nervous/anxious  All other systems reviewed and are negative  Historical Information   No past medical history on file  No past surgical history on file  History   Alcohol use Not on file     History   Drug use: Unknown     History   Smoking Status    Never Smoker   Smokeless Tobacco    Never Used     Family History: No family history on file  Meds/Allergies     Allergies   Allergen Reactions    Morphine     Tape  [Medical Tape]        Current Outpatient Prescriptions:     aspirin (ECOTRIN LOW STRENGTH) 81 mg EC tablet, Take 81 mg by mouth daily, Disp: , Rfl:     ibuprofen (MOTRIN) 800 mg tablet, Take by mouth, Disp: , Rfl:     levothyroxine 88 mcg tablet, Take 88 mcg by mouth daily, Disp: , Rfl:     losartan (COZAAR) 100 MG tablet, Take 100 mg by mouth daily, Disp: , Rfl:     propranolol (INDERAL) 20 mg tablet, Take 20 mg by mouth every 12 (twelve) hours  , Disp: , Rfl:     simvastatin (ZOCOR) 10 mg tablet, Take 10 mg by mouth daily at bedtime, Disp: , Rfl:     tetanus-diphtheria-acellular pertussis (ADACEL) 5-2-15 5 LF-mcg/0 5 injection, Inject into the shoulder, thigh, or buttocks, Disp: , Rfl:     Vitals: Blood pressure 138/64, pulse 61, height 5' 4" (1 626 m), weight 96 2 kg (212 lb), SpO2 98 %  Body mass index is 36 39 kg/m²    Vitals:    09/14/18 1108   Weight: 96 2 kg (212 lb)     BP Readings from Last 3 Encounters:   09/14/18 138/64   05/22/18 142/64   01/03/18 122/76         Physical Exam   Constitutional: She is oriented to person, place, and time  She appears well-developed  No distress  HENT:   Head: Normocephalic and atraumatic  Eyes: Pupils are equal, round, and reactive to light  Neck: Normal range of motion  Neck supple  No JVD present  No thyromegaly present  Cardiovascular: Normal rate and regular rhythm  Murmur heard  S1-S2 regular with a 2/6 ejection systolic murmur with additional crescendo decrescendo murmur  Pulmonary/Chest: Effort normal and breath sounds normal  No respiratory distress  She has no wheezes  She has no rales  Abdominal: Soft  Bowel sounds are normal  She exhibits no distension  There is no tenderness  There is no rebound  Musculoskeletal: Normal range of motion  She exhibits no edema or tenderness  Fracture left humerus  Follow up with Orthopedics   Neurological: She is alert and oriented to person, place, and time  Skin: Skin is warm and dry  She is not diaphoretic  Psychiatric: She has a normal mood and affect  Her behavior is normal  Judgment normal          Diagnostic Studies Review Cardio:    Stress Test: Stress echocardiogram done in 2014 by a different cardiologist shows normal global function, no segment wall motion abnormality, EF was normal however the study was limited and patient could not get 85% maximum predicted heart rate due to beta blockade stress test done in April 2017 shows no evidence of ischemia  Mild fixed defect EF was normal       Echocardiogram/NIKIA: There is no recent echo, echo done in 2014 shows EF 57%, moderately dilated LV and moderately dilated LA, mild MR, mild-to-moderate aortic regurgitation Doppler done in April 2017 shows normal LV systolic function, grade 2 diastolic dysfunction, moderate AI, mild MR, moderately dilated left atrium, mild-to-moderate TR with PA pressure around 30 mmHg      ECG Report:      Comparison to prior ECGs: no interval change  Twelve-lead EKG done in 2016 shows normal sinus rhythm heart rate 63 bpm    -lead EKG done on 3/28/2017 shows normal sinus stump  Heart rate 57 bpm  Nonspecific ST changes  No change from old EKG   /2018  lead EKG shows sinus bradycardia heart rate 51 beats per minute  No significant change from old EKG     2018 12 lead EKG shows sinus bradycardia heart rate 50 beats per minute  No significant ST changes  Cardiac testing:   Results for orders placed during the hospital encounter of 18   Echo complete with contrast if indicated    Narrative [address]  [address] [facility], [place]  [phone]    Transthoracic Echocardiogram  2D, M-mode, Doppler, and Color Doppler    Study date:  2018    Patient: Leanne Arenas  MR number: [id number]  Account number: [de-identified]  : 1935  Age: [de-identified] years  Gender: Female  Status: Routine  Location: Echo lab  Height: 63 in  Weight: 214 5 lb  BP: 122/ 74 mmHg    Indications: Palpitations    Diagnoses: R00 2 - Palpitations    Sonographer:  VITOR Headley  Primary Physician:  Richardson Vaca MD  Referring Physician:  Marbella Obregon MD  Group:  [id number]  Interpreting Physician:  Marbella Obregon MD    SUMMARY    LEFT VENTRICLE:  Systolic function was normal  Ejection fraction was estimated in the range of 55 % to 60 % to be 55 %  There were no regional wall motion abnormalities  Wall thickness was mildly increased  There was mild concentric hypertrophy  Features were consistent with a pseudonormal left ventricular filling pattern, with concomitant abnormal relaxation and increased filling pressure (grade 2 diastolic dysfunction)  LEFT ATRIUM:  The atrium was mildly to moderately dilated  RIGHT ATRIUM:  The atrium was mildly dilated  MITRAL VALVE:  There was mild to moderate regurgitation  AORTIC VALVE:  There was mild to moderate regurgitation   mec    TRICUSPID VALVE:  There was mild regurgitation  Estimated peak PA pressure was 35 mmHg  IVC, HEPATIC VEINS:  The inferior vena cava was mildly dilated  Respirophasic changes were blunted (less than 50% variation)  HISTORY: PRIOR HISTORY: HTN, Dyslipidemia, Hypothyroidism    PROCEDURE: The procedure was performed in the echo lab  This was a routine study  The transthoracic approach was used  The study included complete 2D imaging, M-mode, complete spectral Doppler, and color Doppler  The heart rate was 60 bpm,  at the start of the study  Image quality was adequate  LEFT VENTRICLE: Size was normal  Systolic function was normal  Ejection fraction was estimated in the range of 55 % to 60 % to be 55 %  There were no regional wall motion abnormalities  Wall thickness was mildly increased  There was mild  concentric hypertrophy  DOPPLER: Features were consistent with a pseudonormal left ventricular filling pattern, with concomitant abnormal relaxation and increased filling pressure (grade 2 diastolic dysfunction)  RIGHT VENTRICLE: The size was normal  Systolic function was normal     LEFT ATRIUM: The atrium was mildly to moderately dilated  RIGHT ATRIUM: The atrium was mildly dilated  MITRAL VALVE: There was normal leaflet separation  DOPPLER: The transmitral velocity was within the normal range  There was no evidence for stenosis  There was mild to moderate regurgitation  AORTIC VALVE: The valve was trileaflet  Leaflets exhibited mildly increased thickness and normal cuspal separation  DOPPLER: Transaortic velocity was within the normal range  There was no evidence for stenosis  There was mild to moderate  regurgitation   mec    TRICUSPID VALVE: DOPPLER: There was mild regurgitation  Estimated peak PA pressure was 35 mmHg  PULMONIC VALVE: DOPPLER: There was mild regurgitation  PERICARDIUM: There was no thickening or calcification   There was no pericardial effusion  AORTA: The root exhibited normal size  SYSTEMIC VEINS: IVC: The inferior vena cava was mildly dilated  Respirophasic changes were blunted (less than 50% variation)  SYSTEM MEASUREMENT TABLES    2D mode  AoR Diam 2D: 2 8 cm  LA Diam (2D): 5 cm  LA/Ao (2D): 1 79  FS (2D Teich): 31 7 %  IVSd (2D): 1 01 cm  LVDEV: 120 cm³  LVESV: 48 8 cm³  LVIDd(2D): 5 04 cm  LVISd (2D): 3 44 cm  LVPWd (2D): 1 01 cm  SV (Teich): 71 2 cm³    Apical four chamber  LVEF A4C: 57 %    Unspecified Scan Mode  MV Peak A Franklyn: 223 mm/s  MV Peak E Franklyn  Mean: 891 mm/s  MVA (PHT): 3 38 cm squared  PHT: 65 ms  Max P mm[Hg]  V Max: 2690 mm/s  Vmax: 2550 mm/s  RA Area: 20 9 cm squared  RA Volume: 56 cm³  TAPSE: 2 2 cm    [facility] Accredited Echocardiography Laboratory    Prepared and electronically signed by    Kyaw Sena MD  Signed 10-Feb-2018 13:54:54         Results for orders placed during the hospital encounter of 17   NM myocardial perfusion spect (rx stress and/or rest)    [facility] [address]  [address] [facility] [place] [phone]    Rest/Stress Gated SPECT Myocardial Perfusion Imaging After Regadenoson    Patient: LILLIE WRIGHT  MR number: [id number]  Account number: [de-identified]  : 1935  Age: 80 years  Gender: Female  Status: Outpatient  Location: Stress lab  Height: 63 in  Weight: 214 lb  BP: 152/ 61 mmHg    Allergies: ALLERGIES NOT ON FILE    Diagnosis: 782 3 - EDEMA, R06 00 - Dyspnea, unspecified    Primary Physician:  Varinder Leonardo MD  RN:  AGUSTINA Tapia  Referring Physician:  Kyaw Sena MD  Group:  [facility]  Report Prepared By[de-identified]  AGUSTINA Tapia  Interpreting Physician:  Negra Wetzel DO    INDICATIONS: Detection of coronary artery disease  HISTORY: The patient is a 80year old  female  Chest pain status: no chest pain  Other symptoms: dyspnea and edema   Coronary artery disease risk factors: dyslipidemia and hypertension  Cardiovascular history: Pulmonary Emboli-DVT  Co-morbidity: obesity  Medications: a beta blocker, aspirin, a lipid lowering agent, an antihypertensive agent, and thyroid medications  Previous test results: abnormal resting echocardiogram     PHYSICAL EXAM: Baseline physical exam screening: no wheezes audible  REST ECG: Sinus bradycardia  PROCEDURE: The study was performed in the stress lab  A regadenoson infusion pharmacologic stress test was performed  Gated SPECT myocardial perfusion imaging was performed after stress and at rest  Systolic blood pressure was 152 mmHg, at  the start of the study  Diastolic blood pressure was 61 mmHg, at the start of the study  The heart rate was 58 bpm, at the start of the study  IV double checked  Regadenoson protocol:  Time HR bpm SBP mmHg DBP mmHg Symptoms Rhythm/conduct  Baseline 09:54 57 152 61 none sinus petar  Immediate 09:59 85 154 68 mild dyspnea NSR, occasional PVC's  1 min 10:00 84 159 77 same as above --  2 min 10:01 77 169 72 subsiding --  3 min 10:02 73 148 86 none --  No medications or fluids given  STRESS SUMMARY: Duration of pharmacologic stress was 3 min  Maximal heart rate during stress was 85 bpm  The heart rate response to stress was normal  There was resting hypertension with an appropriate blood pressure response to stress  The rate-pressure product for the peak heart rate and blood pressure was 66927  There was no chest pain during stress  The stress test was terminated due to protocol completion  Pre oxygen saturation: 92 %  Peak oxygen saturation: 94 %  The stress ECG was negative for ischemia and normal  There were no stress arrhythmias or conduction abnormalities      ISOTOPE ADMINISTRATION:  Resting isotope administration Stress isotope administration  Agent Tetrofosmin Tetrofosmin  Dose 10 5 mCi 31 7 mCi  Date 04/04/2017 04/04/2017    The radiopharmaceutical was injected at the peak effect of pharmacologic stress  MYOCARDIAL PERFUSION IMAGING:  The image quality was good  Rotating projection images reveal breast attenuation  PERFUSION DEFECTS:  -  There was a small, mildly severe, fixed myocardial perfusion defect of the anterior wall possibly due to attenuation from breast tissue  GATED SPECT:  The calculated left ventricular ejection fraction was 73 %  There was no left ventricular regional abnormality  SUMMARY:  -  Stress results: There was resting hypertension with an appropriate blood pressure response to stress  There was no chest pain during stress  -  ECG conclusions: The stress ECG was negative for ischemia and normal   -  Perfusion imaging: There was a small, mildly severe, fixed myocardial perfusion defect of the anterior wall possibly due to attenuation from breast tissue   -  Gated SPECT: The calculated left ventricular ejection fraction was 73 %  There was no left ventricular regional abnormality  IMPRESSIONS: Abnormal study after pharmacologic stress  There was a small infarct in the anterior region that may be due to breast attenuation artifact  Left ventricular systolic function was normal     Prepared and signed by    Toshia Garcia DO  Signed 04/04/2017 16:47:37           Dr Marbella Obregon MD [facility]      "This note has been constructed using a voice recognition system  Therefore there may be syntax, spelling, and/or grammatical errors   Please call if you have any questions  "

## 2018-09-14 ENCOUNTER — OFFICE VISIT (OUTPATIENT)
Dept: CARDIOLOGY CLINIC | Facility: CLINIC | Age: 83
End: 2018-09-14
Payer: MEDICARE

## 2018-09-14 VITALS
WEIGHT: 212 LBS | DIASTOLIC BLOOD PRESSURE: 64 MMHG | HEART RATE: 61 BPM | HEIGHT: 64 IN | SYSTOLIC BLOOD PRESSURE: 138 MMHG | OXYGEN SATURATION: 98 % | BODY MASS INDEX: 36.19 KG/M2

## 2018-09-14 DIAGNOSIS — E03.4 HYPOTHYROIDISM DUE TO ACQUIRED ATROPHY OF THYROID: ICD-10-CM

## 2018-09-14 DIAGNOSIS — I35.1 MODERATE AORTIC REGURGITATION: ICD-10-CM

## 2018-09-14 DIAGNOSIS — R00.1 SINUS BRADYCARDIA: ICD-10-CM

## 2018-09-14 DIAGNOSIS — R00.2 HEART PALPITATIONS: ICD-10-CM

## 2018-09-14 DIAGNOSIS — E78.5 DYSLIPIDEMIA: ICD-10-CM

## 2018-09-14 DIAGNOSIS — Z91.81 STATUS POST FALL: ICD-10-CM

## 2018-09-14 DIAGNOSIS — R06.00 DYSPNEA ON EXERTION: ICD-10-CM

## 2018-09-14 DIAGNOSIS — I10 BENIGN ESSENTIAL HYPERTENSION: ICD-10-CM

## 2018-09-14 PROCEDURE — 99214 OFFICE O/P EST MOD 30 MIN: CPT | Performed by: INTERNAL MEDICINE

## 2018-11-14 ENCOUNTER — OFFICE VISIT (OUTPATIENT)
Dept: VASCULAR SURGERY | Facility: CLINIC | Age: 83
End: 2018-11-14
Payer: MEDICARE

## 2018-11-14 VITALS
BODY MASS INDEX: 33.32 KG/M2 | DIASTOLIC BLOOD PRESSURE: 68 MMHG | TEMPERATURE: 97.9 F | SYSTOLIC BLOOD PRESSURE: 126 MMHG | WEIGHT: 200 LBS | HEART RATE: 64 BPM | HEIGHT: 65 IN

## 2018-11-14 DIAGNOSIS — E78.5 DYSLIPIDEMIA: ICD-10-CM

## 2018-11-14 DIAGNOSIS — I10 BENIGN ESSENTIAL HYPERTENSION: ICD-10-CM

## 2018-11-14 DIAGNOSIS — I87.2 VENOUS INSUFFICIENCY (CHRONIC) (PERIPHERAL): ICD-10-CM

## 2018-11-14 DIAGNOSIS — I80.01 THROMBOPHLEBITIS OF SUPERFICIAL VEINS OF RIGHT LOWER EXTREMITY: Primary | ICD-10-CM

## 2018-11-14 DIAGNOSIS — I82.4Y2 DEEP VEIN THROMBOSIS (DVT) OF PROXIMAL VEIN OF LEFT LOWER EXTREMITY, UNSPECIFIED CHRONICITY (HCC): ICD-10-CM

## 2018-11-14 PROBLEM — I82.409 DVT (DEEP VENOUS THROMBOSIS) (HCC): Status: RESOLVED | Noted: 2018-11-14 | Resolved: 2018-11-14

## 2018-11-14 PROCEDURE — 99214 OFFICE O/P EST MOD 30 MIN: CPT | Performed by: NURSE PRACTITIONER

## 2018-11-14 RX ORDER — BENZONATATE 100 MG/1
CAPSULE ORAL
COMMUNITY
Start: 2018-11-12 | End: 2019-01-29

## 2018-11-14 RX ORDER — WARFARIN SODIUM 2 MG/1
TABLET ORAL
COMMUNITY
Start: 2018-10-24 | End: 2019-01-29

## 2018-11-14 NOTE — ASSESSMENT & PLAN NOTE
Right GSV thrombophlebitis  -10/22/18 LEV for left upper thigh pain; with thrombosis in the GSV extending to the SFJ   -patient symptoms have resolved, she is taking Coumadin per PCP  -recommend continuing 6 weeks treatment for superficial thrombosis  -recommend compression stockings to be worn daily  Patient unsure she will be able to don compression stockings so TubiGrip F were applied at visit and Rx given for TubiGrip  Patient should wear during waking hours, continue to ambulate and exercise as tolerated  -f/u with Vascular PRN; discussed with Micky White who is aware of plan and f/u  She reported recent left humerus fx, patient more sedentary which is likely the reason for the the SVT    If recurrent symptoms, would rescan legs and could consider Heme consult for further work-up

## 2018-11-14 NOTE — PATIENT INSTRUCTIONS
Apply TubiGrip F to both legs (to the knees) during waking hours  Elevate your legs when you are able  Continue to be active and walk as tolerated  Peripheral Vascular Disease   AMBULATORY CARE:   Peripheral vascular disease (PVD)  is a condition that causes decreased blood flow to your limbs because of blocked blood vessels  The blockage is usually caused by atherosclerosis  This is when material, such as cholesterol, sticks to the inside of your blood vessels and makes them narrow  Common symptoms include the following:   · Painful cramps in your hip, thigh, or calf muscles, especially after you walk or climb stairs    · Burning pain in your hands, fingers, feet, or toes    · Shiny, tight, cold skin, and uneven hair growth on your skin    · A change in your skin color    · Sores on your skin that do not heal    · Weakness or numbness of your hands or feet  Call 911 for any of the following:   · You have any of the following signs of a heart attack:      ¨ Squeezing, pressure, or pain in your chest that lasts longer than 5 minutes or returns    ¨ Discomfort or pain in your back, neck, jaw, stomach, or arm     ¨ Trouble breathing    ¨ Nausea or vomiting    ¨ Lightheadedness or a sudden cold sweat, especially with chest pain or trouble breathing    · You have any of the following signs of a stroke:      ¨ Numbness or drooping on one side of your face     ¨ Weakness in an arm or leg    ¨ Confusion or difficulty speaking    ¨ Dizziness, a severe headache, or vision loss  Seek care immediately if:   · Your arm or leg feels warm, tender, and painful  It may look swollen and red  · You have pain in your legs that does not go away with rest     · You have dark areas on the skin of your legs  · You cannot see out of one or both of your eyes  Contact your healthcare provider if:   · Your signs and symptoms get worse or do not get better, even after treatment      · You have a sore or ulcer that is not healing or gets worse  · You have questions or concerns about your condition or care  Treatment for PVD  may include any of the following:  · Medicines  may be given to help decrease your cholesterol level, open blood vessels, or prevent blood clots  · Procedures  may be used to open blocked blood vessels  Metal or plastic stents (tubes) may be put in where the artery was blocked to keep it open  Surgery may be used to place a new blood vessel near the one that is blocked, or replace the area of the blood vessel  Manage PVD:   · Do not smoke  Nicotine and other chemicals in cigarettes and cigars can damage your blood vessels  Ask your healthcare provider for information if you currently smoke and need help to quit  E-cigarettes or smokeless tobacco still contain nicotine  Talk to your healthcare provider before you use these products  · Get regular exercise  Ask about the best exercise plan for you  Walking is a low-impact way to exercise and increase your blood flow  Stop and rest if you have pain in your legs  · Care for your feet  Look closely at your feet every day  Check for cracks or sores  Wash your feet daily with mild soap and dry them well  Do not walk barefoot in case you step on a hard or sharp object  · Change your sleep position  You may have pain in your legs or feet when you sleep  Raise the head of your bed 4 inches, or use pillows to prop your upper body higher than your legs  This may help more blood go to your feet, decreasing pain  · Protect and cushion your feet and hands  If you have ulcers on your feet, you may need to wear bandages with heel pads  You may also wear foam rubber booties  Hand or foot warmers may decrease pain in your hands or feet  Prevent PVD:   · Eat a variety of healthy foods  Healthy foods include fruits, vegetables, whole-grain breads, low-fat dairy products, beans, lean meats, and fish  Ask if you need to be on a special diet      · Maintain a healthy weight  Ask your healthcare provider how much you should weigh  Ask him to help you create a weight loss plan if you are overweight  · Manage diabetes  Keep your blood sugar level in the correct range  Check your blood sugar level often  Ask your healthcare provider if you should make changes to your diet, exercise, or medications  Follow up with your healthcare provider as directed:  Write down your questions so you remember to ask them during your visits  © 2017 [employer]  Information is for End User's use only and may not be sold, redistributed or otherwise used for commercial purposes  All illustrations and images included in CareNotes® are the copyrighted property of [employer]  or [employer] Jimenez  The above information is an  only  It is not intended as medical advice for individual conditions or treatments  Talk to your doctor, nurse or pharmacist before following any medical regimen to see if it is safe and effective for you

## 2018-11-14 NOTE — ASSESSMENT & PLAN NOTE
PMH of LLE DVT (2010) was treated with Coumadin for 1 yr  -unclear as this sounds like an unprovoked DVT, no records to review  -see plan under superficial thrombophlebitis

## 2018-11-14 NOTE — ASSESSMENT & PLAN NOTE
Chronic venous insufficiency  -PMH of DVT to LLE; currently being treated for superficial thrombophlebitis to the Right GSV; HTN, chronic changes noted to bilateral lower extremities including hyperpigmentation, spider and varicose veins    We discussed the pathophysiology of varicose veins and venous insufficiency  Patient expressed understanding  Will start will conservative measures to aid in symptom relief and progression of disease  PLAN:  -recommend compression stockings 20-30mmHg to be worn during waking hours; patient does not think she will be able to apply compression stocking    Rx for TubiGrip F given with one set applied at todays visit   -elevate legs throughout the day as able  -exercise daily as tolerated  -continue weight loss and low-fat low-chol diet  -return for f/u PRN

## 2018-11-14 NOTE — LETTER
November 14, 2018     Lida Gallagher MD  [id number] [facility] Male [address] [facility] [address]    Patient: Lisa Tam   YOB: 1935   Date of Visit: 11/14/2018       Dear Dr Savanna Dickens: Thank you for referring Lisa Tam to me for evaluation  Below are my notes for this consultation  If you have questions, please do not hesitate to call me  I look forward to following your patient along with you           Sincerely,        SHARMAINE Tierney        CC: Anika Dorado

## 2018-11-14 NOTE — PROGRESS NOTES
Assessment/Plan:    Benign essential hypertension  HTN  -continue medical management per PCP    DVT (deep venous thrombosis) ([facility])  PMH of LLE DVT (2010) was treated with Coumadin for 1 yr  -unclear as this sounds like an unprovoked DVT, no records to review  -see plan under superficial thrombophlebitis    Thrombophlebitis of superficial veins of right lower extremity  Right GSV thrombophlebitis  -10/22/18 LEV for left upper thigh pain; with thrombosis in the GSV extending to the SFJ   -patient symptoms have resolved, she is taking Coumadin per PCP  -recommend continuing 6 weeks treatment for superficial thrombosis  -recommend compression stockings to be worn daily  Patient unsure she will be able to don compression stockings so TubiGrip F were applied at visit and Rx given for TubiGrip  Patient should wear during waking hours, continue to ambulate and exercise as tolerated  -f/u with Vascular PRN; discussed with Micky White who is aware of plan and f/u  She reported recent left humerus fx, patient more sedentary which is likely the reason for the the SVT  If recurrent symptoms, would rescan legs and could consider Heme consult for further work-up      Venous insufficiency (chronic) (peripheral)  Chronic venous insufficiency  -PMH of DVT to LLE; currently being treated for superficial thrombophlebitis to the Right GSV; HTN, chronic changes noted to bilateral lower extremities including hyperpigmentation, spider and varicose veins    We discussed the pathophysiology of varicose veins and venous insufficiency  Patient expressed understanding  Will start will conservative measures to aid in symptom relief and progression of disease  PLAN:  -recommend compression stockings 20-30mmHg to be worn during waking hours; patient does not think she will be able to apply compression stocking    Rx for TubiGrip F given with one set applied at todays visit   -elevate legs throughout the day as able  -exercise daily as tolerated  -continue weight loss and low-fat low-chol diet  -return for f/u PRN        Dyslipidemia  Dyslipidemia  -continue with statin therapy  -management per PCP       Diagnoses and all orders for this visit:    Thrombophlebitis of superficial veins of right lower extremity    Dyslipidemia    Benign essential hypertension    Venous insufficiency (chronic) (peripheral)  -     Compression bandages    Deep vein thrombosis (DVT) of proximal vein of left lower extremity, unspecified chronicity (HCC)    Other orders  -     benzonatate (TESSALON PERLES) 100 mg capsule;   -     warfarin (COUMADIN) 2 mg tablet;           Subjective:      Patient ID: Venita Bach is a 82 y o  female  Patient is new to our practice and was referred by her PCP, Dr Malcolm Villeda  She presents today to discuss results of LEV done on 10/22/18  Patient states that on 10/22/18 she went to [facility] ER with pain in her right leg that radiated down the leg  Symptoms have since resolved  She denies pain, redness and swelling at this time  Patient states she has history of DVT of left leg in 2010  Patient does not wear compression stockings  Patient is taking Coumadin daily  Kanwal Lala is a 83yo female with a PMH of HTN, HLD, SB, hypothyroidism, left leg DVT 2010 with coumadin therapy for 1 yr, breast cancer, recent basal cell cancer who presents to the Vascular office today for evaluation of her right leg superficial thrombophlebitis on a referral from [facility]  Patient reports sudden onset of right upper thigh pain, tenderness, hardness, erythema, and she could not walk well  She has been more sedentary following a left humerus fx  This started in October and she went for an ultrasound which revealed a right superficial thrombophlebitis to the GSV to the level of the SFJ  She was started on coumadin for the SVT by her PCP  She reports she elevates occasionally with some relief  She is not currently wearing compression stockings    She is seeing a dermatologist for the basal cell cancer to her face  She denies any family history of DVT/PE, denies any bleeding or clotting disorders  She does have varicose veins, spider veins and chronic venous changes to bilateral legs  She is a non-smoker  She stopped taking ASA when she started the coumadin  She reports her symptoms have improved  The following portions of the patient's history were reviewed and updated as appropriate: allergies, current medications, past family history, past medical history, past social history, past surgical history and problem list     Review of Systems   Constitutional: Positive for activity change  Negative for unexpected weight change (weight loss of 6 lbs, intentional)  HENT: Positive for congestion, postnasal drip, rhinorrhea, sinus pressure and sneezing  Hoarseness   Eyes: Negative  Respiratory: Positive for cough and wheezing  Cardiovascular: Positive for leg swelling  Negative for chest pain and palpitations  Painful veins   Gastrointestinal: Positive for constipation  Endocrine: Positive for cold intolerance and polyuria  Genitourinary: Negative  Musculoskeletal: Negative  Leg pain   Skin: Positive for color change (hyperpigmentation to B/L legs at the ankle)  Negative for wound  Allergic/Immunologic: Negative  Neurological: Negative  Hematological: Negative  Psychiatric/Behavioral: Negative  ROS reviewed and updated  Objective:      /68 (BP Location: Right arm, Patient Position: Sitting)   Pulse 64   Temp 97 9 °F (36 6 °C) (Tympanic)   Ht 5' 5" (1 651 m)   Wt 90 7 kg (200 lb)   BMI 33 28 kg/m²          Physical Exam   Constitutional: She is oriented to person, place, and time  She appears well-developed and well-nourished  HENT:   Head: Normocephalic and atraumatic  Eyes: Pupils are equal, round, and reactive to light  EOM are normal  No scleral icterus  Neck: Normal range of motion  Carotid bruit is not present  Cardiovascular: Normal rate, regular rhythm and intact distal pulses  Murmur heard  Pulmonary/Chest: Effort normal and breath sounds normal  No respiratory distress  Abdominal: Soft  Bowel sounds are normal    Musculoskeletal: Normal range of motion  Neurological: She is alert and oriented to person, place, and time  She has normal strength  Skin: Skin is warm, dry and intact  Bilateral spider and varicose veins  -hyperpigmentation to ankles R>L  -purple discoloration to B/L feet  -toes are cool to touch   Psychiatric: She has a normal mood and affect  Her speech is normal and behavior is normal  Judgment and thought content normal  Cognition and memory are normal    Nursing note and vitals reviewed  Vitals:    11/14/18 0946   BP: 126/68   BP Location: Right arm   Patient Position: Sitting   Pulse: 64   Temp: 97 9 °F (36 6 °C)   TempSrc: Tympanic   Weight: 90 7 kg (200 lb)   Height: 5' 5" (1 651 m)       Patient Active Problem List   Diagnosis    Benign essential hypertension    Dyslipidemia    Heart palpitations    Hypothyroidism    Moderate aortic regurgitation    Sinus bradycardia    Dyspnea on exertion    Status post fall    Thrombophlebitis of superficial veins of right lower extremity    Venous insufficiency (chronic) (peripheral)       Past Surgical History:   Procedure Laterality Date    BREAST LUMPECTOMY Right     HYSTERECTOMY      SKIN CANCER EXCISION         Family History   Problem Relation Age of Onset    Heart disease Mother     Diabetes Mother     Heart disease Father     Prostate cancer Father        Social History     Social History    Marital status: /Civil Union     Spouse name: N/A    Number of children: N/A    Years of education: N/A     Occupational History    Not on file       Social History Main Topics    Smoking status: Never Smoker    Smokeless tobacco: Never Used    Alcohol use Not on file    Drug use: Unknown    Sexual activity: Not on file     Other Topics Concern    Not on file     Social History Narrative    No narrative on file       Allergies   Allergen Reactions    Morphine     Tape  [Medical Tape]          Current Outpatient Prescriptions:     ibuprofen (MOTRIN) 800 mg tablet, Take by mouth, Disp: , Rfl:     levothyroxine 88 mcg tablet, Take 88 mcg by mouth daily, Disp: , Rfl:     losartan (COZAAR) 100 MG tablet, Take 100 mg by mouth daily, Disp: , Rfl:     propranolol (INDERAL) 20 mg tablet, Take 20 mg by mouth every 12 (twelve) hours  , Disp: , Rfl:     simvastatin (ZOCOR) 10 mg tablet, Take 10 mg by mouth daily at bedtime, Disp: , Rfl:     warfarin (COUMADIN) 2 mg tablet, , Disp: , Rfl:     aspirin (ECOTRIN LOW STRENGTH) 81 mg EC tablet, Take 81 mg by mouth daily, Disp: , Rfl:     benzonatate (TESSALON PERLES) 100 mg capsule, , Disp: , Rfl:

## 2019-01-25 NOTE — PROGRESS NOTES
Progress Note - Cardiology Office  Denver Bishop 82 y o  female MRN: [id number]  : 1935  Encounter: [id number]      Assessment:     1  Benign essential hypertension    2  Moderate aortic regurgitation    3  Dyslipidemia    4  Persistent atrial fibrillation ([address] )    5  Dyspnea on exertion    6  Hypothyroidism due to acquired atrophy of thyroid    7  Sick sinus syndrome [facility])        Discussion Summary and Plan:  1  Moderate aortic regurgitation with dilated LV on last echo  Patient repeat echo shows no significant change in aortic regurgitation  LV appeared to be upper limit of normal size  Echo from 2018 reviewed  Continue same medications  2  Atrial fibrillation with controlled ventricular rate  Patient is noted to be in atrial fibrillation  She is totally asymptomatic  She will not even aware that she has irregular heartbeat  She does have history of palpitations and they have become better since she is on propanolol  Will start her on Coumadin as she does not want to be on Eliquis  She will be given samples of Eliquis for 1 week  She will be enrolled in our Coumadin clinic  Will continue propanolol and will check Holter monitor to rule out her AFib burden as well as average heart rate  3  Dyslipidemia  Continue statins  She is tolerating it very well    4  Hypertension  Blood pressures well controlled with current therapy  She is taking losartan 100 mg daily along with some propanolol  5  Hypothyroidism  On levothyroxine  She is tolerating the medication well    6  Sick sinus syndrome with history of sinus bradycardia  Patient is now atrial fibrillation  She probably have underlying sick sinus syndrome  She is not on very high AV node blocking drug and heart rate is acceptable  Will continue to monitor  Holter monitor already ordered    7  Status post fall  She broke her left humerus  Follow up with Orthopedics  Currently on conservative Rx      Issues related to atrial fibrillation discussed with patient's  at length  Patient's  has long-term atrial fibrillation and is on chronic Coumadin therapy  At this time she does not want any new or antithrombotic therapies  She would prefer to be on Coumadin  She is willing to consider cardioversion but she would like to think about it  Her previous nuclear stress test and echo reviewed  She is totally asymptomatic various rate controlled rhythm call strategy discussed with her at length  Counseling :  A description of the counseling  As above  Patient's ability to self care: Yes  Medication side effect reviewed with patient in detail and all their questions answered to their satisfaction  HPI :     Enrike Pascual is a 82y o  year old female who came for follow up  Patient has a past medical history significant for hypertension, dyslipidemia, hypothyroidism, history of pulmonary embolism with DVT in 2010, breast cancer treated with partial mastectomy in 2003, hyperthyroidism, total abdominal hysterectomy, DJD who has been seeing gas for the last 3-4 years for hypertension  Her previous echo has shown moderate aortic insufficiency, she cannot walk on the treadmill    /20/2017 came for follow-up for her hypertension, dyslipidemia, hypothyroidism  She does have history of pulmonary embolism with DVT in 2010  She is doing well  Denies any chest pain or any shortness of breath  Her echo shows no significant change from old echo  Nuclear stress test was no ischemia  She has no PND no orthopnea and no other significant complaint  She occasionally gets palpitations  She denies any symptoms of dizziness lightheadedness  She feels she missed a beat  Reluctant to do any Holter monitor as she has it for many years and has been worked up in the past        01/29/2019  Patient came for follow-up  She had an EKG done and she was found to be in atrial fibrillation with heart rate around 79 beats per minute    She did have previous history of palpitations but no documented atrial fibrillation  Her Holter monitor in February 2018 shows she was sinus rhythm and EKG done in our office on May of 2018 shows she was in sinus rhythm  She does take propanolol for tremors and heart rate has been acceptable in AFib  She was totally asymptomatic  She does have history of moderate aortic regurgitation and dilated left-sided chambers  Previously she used to get palpitations since she was taking propanolol her palpitations are got lot better  No chest pain no shortness of breath no dizziness no lightheadedness no nausea no vomiting no other problem at this time  She was taking aspirin  At some point she was on antithrombotic therapy when she had a clot and DVT  She stopped taking Coumadin in December  Review of Systems   Constitutional: Negative for activity change, chills, diaphoresis, fever and unexpected weight change  HENT: Negative for congestion  Eyes: Negative for discharge and redness  Respiratory: Positive for shortness of breath  Negative for cough, chest tightness and wheezing  Mostly chronic not changed in exertional   Cardiovascular: Positive for palpitations  Negative for chest pain and leg swelling  Though much better   Gastrointestinal: Negative for abdominal pain, diarrhea and nausea  Endocrine: Negative  Genitourinary: Negative for decreased urine volume and urgency  Musculoskeletal: Positive for arthralgias  Negative for back pain and gait problem  Skin: Negative for rash and wound  Allergic/Immunologic: Negative  Neurological: Negative for dizziness, seizures, syncope, weakness, light-headedness and headaches  Hematological: Negative  Psychiatric/Behavioral: Negative for agitation and confusion  The patient is nervous/anxious          Historical Information   Past Medical History:   Diagnosis Date    DVT (deep venous thrombosis) ([facility] [address] )      Past Surgical History: Procedure Laterality Date    BREAST LUMPECTOMY Right     HYSTERECTOMY      SKIN CANCER EXCISION       History   Alcohol use Not on file     History   Drug use: Unknown     History   Smoking Status    Never Smoker   Smokeless Tobacco    Never Used     Family History:   Family History   Problem Relation Age of Onset    Heart disease Mother     Diabetes Mother     Heart disease Father     Prostate cancer Father        Meds/Allergies     Allergies   Allergen Reactions    Morphine     Tape  [Medical Tape]        Current Outpatient Prescriptions:     levothyroxine 88 mcg tablet, Take 88 mcg by mouth daily, Disp: , Rfl:     losartan (COZAAR) 100 MG tablet, Take 100 mg by mouth daily, Disp: , Rfl:     propranolol (INDERAL) 20 mg tablet, Take 20 mg by mouth every 12 (twelve) hours  , Disp: , Rfl:     simvastatin (ZOCOR) 10 mg tablet, Take 10 mg by mouth daily at bedtime, Disp: , Rfl:     warfarin (COUMADIN) 5 mg tablet, Take 1 tablet (5 mg total) by mouth daily Target INR is 2-3, Disp: 30 tablet, Rfl: 3    Vitals: Blood pressure 124/70, pulse 76, height 5' 5" (1 651 m), weight 91 2 kg (201 lb), SpO2 96 %  Body mass index is 33 45 kg/m²  Vitals:    01/29/19 1108   Weight: 91 2 kg (201 lb)     BP Readings from Last 3 Encounters:   01/29/19 124/70   11/14/18 126/68   09/14/18 138/64         Physical Exam   Constitutional: She is oriented to person, place, and time  She appears well-developed and well-nourished  No distress  HENT:   Head: Normocephalic and atraumatic  Eyes: Pupils are equal, round, and reactive to light  Neck: Neck supple  No JVD present  No thyromegaly present  Cardiovascular: Normal rate, S1 normal, S2 normal and intact distal pulses  An irregularly irregular rhythm present  Exam reveals no gallop, no S3, no S4, no distant heart sounds and no friction rub  Murmur heard     Systolic murmur is present   Pulmonary/Chest: Effort normal and breath sounds normal  No respiratory distress  She has no wheezes  She has no rales  She exhibits no tenderness  Abdominal: Soft  She exhibits no distension  There is no tenderness  Musculoskeletal: She exhibits no edema or deformity  Lymphadenopathy:     She has no cervical adenopathy  Neurological: She is alert and oriented to person, place, and time  Skin: Skin is warm and dry  No rash noted  She is not diaphoretic  No pallor  Psychiatric: She has a normal mood and affect  Judgment normal          Diagnostic Studies Review Cardio:    Stress Test: Stress echocardiogram done in 2014 by a different cardiologist shows normal global function, no segment wall motion abnormality, EF was normal however the study was limited and patient could not get 85% maximum predicted heart rate due to beta blockade stress test done in April 2017 shows no evidence of ischemia  Mild fixed defect EF was normal         Echocardiogram/NIKIA: There is no recent echo, echo done in 2014 shows EF 57%, moderately dilated LV and moderately dilated LA, mild MR, mild-to-moderate aortic regurgitation Doppler done in April 2017 shows normal LV systolic function, grade 2 diastolic dysfunction, moderate AI, mild MR, moderately dilated left atrium, mild-to-moderate TR with PA pressure around 30 mmHg  Echo Doppler done in February of 2018 shows EF around 55%, mild concentric left hypertrophy, grade 2 diastolic dysfunction, left atrium mild-to-moderate dilated, right atrium mildly dilated, mild-to-moderate MR, mild-to-moderate AI, mild TR with PA pressure 35 mmHg  ECG Report:      Comparison to prior ECGs: no interval change  Twelve-lead EKG done in April 2016 shows normal sinus rhythm heart rate 63 bpm      -lead EKG done on 3/28/2017 shows normal sinus stump  Heart rate 57 bpm  Nonspecific ST changes  No change from old EKG     /03/2018  lead EKG shows sinus bradycardia heart rate 51 beats per minute   No significant change from old EKG       05/22/2018 12 lead EKG shows sinus bradycardia heart rate 50 beats per minute  No significant ST changes  Twelve lead EKG done 2019 shows atrial fibrillation heart rate 79 beats per minute  This is a interval change as compared to old EKG done in May of 2018 patient is now in atrial fibrillation  Cardiac testing:   Results for orders placed during the hospital encounter of 18   Echo complete with contrast if indicated    Narrative [address]  [address] [facility]  [address]  [phone]    Transthoracic Echocardiogram  2D, M-mode, Doppler, and Color Doppler    Study date:  2018    Patient: Anita Abel  MR number: [id number]  Account number: [de-identified]  : 1935  Age: [de-identified] years  Gender: Female  Status: Routine  Location: Echo lab  Height: 63 in  Weight: 214 5 lb  BP: 122/ 74 mmHg    Indications: Palpitations    Diagnoses: R00 2 - Palpitations    Sonographer:  VITOR Schumacher  Primary Physician:  Jerry Gilliland MD  Referring Physician:  Johnathan Jonas MD  Group:  [facility]  Interpreting Physician:  Johnathan Jonas MD    SUMMARY    LEFT VENTRICLE:  Systolic function was normal  Ejection fraction was estimated in the range of 55 % to 60 % to be 55 %  There were no regional wall motion abnormalities  Wall thickness was mildly increased  There was mild concentric hypertrophy  Features were consistent with a pseudonormal left ventricular filling pattern, with concomitant abnormal relaxation and increased filling pressure (grade 2 diastolic dysfunction)  LEFT ATRIUM:  The atrium was mildly to moderately dilated  RIGHT ATRIUM:  The atrium was mildly dilated  MITRAL VALVE:  There was mild to moderate regurgitation  AORTIC VALVE:  There was mild to moderate regurgitation   mec    TRICUSPID VALVE:  There was mild regurgitation  Estimated peak PA pressure was 35 mmHg      IVC, HEPATIC VEINS:  The inferior vena cava was mildly dilated  Respirophasic changes were blunted (less than 50% variation)  HISTORY: PRIOR HISTORY: HTN, Dyslipidemia, Hypothyroidism    PROCEDURE: The procedure was performed in the echo lab  This was a routine study  The transthoracic approach was used  The study included complete 2D imaging, M-mode, complete spectral Doppler, and color Doppler  The heart rate was 60 bpm,  at the start of the study  Image quality was adequate  LEFT VENTRICLE: Size was normal  Systolic function was normal  Ejection fraction was estimated in the range of 55 % to 60 % to be 55 %  There were no regional wall motion abnormalities  Wall thickness was mildly increased  There was mild  concentric hypertrophy  DOPPLER: Features were consistent with a pseudonormal left ventricular filling pattern, with concomitant abnormal relaxation and increased filling pressure (grade 2 diastolic dysfunction)  RIGHT VENTRICLE: The size was normal  Systolic function was normal     LEFT ATRIUM: The atrium was mildly to moderately dilated  RIGHT ATRIUM: The atrium was mildly dilated  MITRAL VALVE: There was normal leaflet separation  DOPPLER: The transmitral velocity was within the normal range  There was no evidence for stenosis  There was mild to moderate regurgitation  AORTIC VALVE: The valve was trileaflet  Leaflets exhibited mildly increased thickness and normal cuspal separation  DOPPLER: Transaortic velocity was within the normal range  There was no evidence for stenosis  There was mild to moderate  regurgitation   mec    TRICUSPID VALVE: DOPPLER: There was mild regurgitation  Estimated peak PA pressure was 35 mmHg  PULMONIC VALVE: DOPPLER: There was mild regurgitation  PERICARDIUM: There was no thickening or calcification  There was no pericardial effusion  AORTA: The root exhibited normal size  SYSTEMIC VEINS: IVC: The inferior vena cava was mildly dilated   Respirophasic changes were blunted (less than 50% variation)  SYSTEM MEASUREMENT TABLES    2D mode  AoR Diam 2D: 2 8 cm  LA Diam (2D): 5 cm  LA/Ao (2D): 1 79  FS (2D Teich): 31 7 %  IVSd (2D): 1 01 cm  LVDEV: 120 cm³  LVESV: 48 8 cm³  LVIDd(2D): 5 04 cm  LVISd (2D): 3 44 cm  LVPWd (2D): 1 01 cm  SV (Teich): 71 2 cm³    Apical four chamber  LVEF A4C: 57 %    Unspecified Scan Mode  MV Peak A Franklyn: 223 mm/s  MV Peak E Franklyn  Mean: 891 mm/s  MVA (PHT): 3 38 cm squared  PHT: 65 ms  Max P mm[Hg]  V Max: 2690 mm/s  Vmax: 2550 mm/s  RA Area: 20 9 cm squared  RA Volume: 56 cm³  TAPSE: 2 2 cm    [facility] Accredited Echocardiography Laboratory    Prepared and electronically signed by    Jack Estrada MD  Signed 10-Feb-2018 13:54:54         Results for orders placed during the hospital encounter of 17   NM myocardial perfusion spect (rx stress and/or rest)    [facility]  [address] [address]  [phone]    Rest/Stress Gated SPECT Myocardial Perfusion Imaging After Regadenoson    Patient: LILLIE WRIGHT  MR number: [id number]  Account number: [de-identified]  : 1935  Age: 80 years  Gender: Female  Status: Outpatient  Location: Stress lab  Height: 63 in  Weight: 214 lb  BP: 152/ 61 mmHg    Allergies: ALLERGIES NOT ON FILE    Diagnosis: 782 3 - EDEMA, R06 00 - Dyspnea, unspecified    Primary Physician:  Patrice Castellon MD  RN:  AGUSTINA Bashir  Referring Physician:  Jack Estrada MD  Group:  [facility]  Report Prepared By[de-identified]  AGUSTINA Bashir  Interpreting Physician:  Gill Hansen DO    INDICATIONS: Detection of coronary artery disease  HISTORY: The patient is a 80year old  female  Chest pain status: no chest pain  Other symptoms: dyspnea and edema  Coronary artery disease risk factors: dyslipidemia and hypertension  Cardiovascular history: Pulmonary Emboli-DVT  Co-morbidity: obesity   Medications: a beta blocker, aspirin, a lipid lowering agent, an antihypertensive agent, and thyroid medications  Previous test results: abnormal resting echocardiogram     PHYSICAL EXAM: Baseline physical exam screening: no wheezes audible  REST ECG: Sinus bradycardia  PROCEDURE: The study was performed in the stress lab  A regadenoson infusion pharmacologic stress test was performed  Gated SPECT myocardial perfusion imaging was performed after stress and at rest  Systolic blood pressure was 152 mmHg, at  the start of the study  Diastolic blood pressure was 61 mmHg, at the start of the study  The heart rate was 58 bpm, at the start of the study  IV double checked  Regadenoson protocol:  Time HR bpm SBP mmHg DBP mmHg Symptoms Rhythm/conduct  Baseline 09:54 57 152 61 none sinus petar  Immediate 09:59 85 154 68 mild dyspnea NSR, occasional PVC's  1 min 10:00 84 159 77 same as above --  2 min 10:01 77 169 72 subsiding --  3 min 10:02 73 148 86 none --  No medications or fluids given  STRESS SUMMARY: Duration of pharmacologic stress was 3 min  Maximal heart rate during stress was 85 bpm  The heart rate response to stress was normal  There was resting hypertension with an appropriate blood pressure response to stress  The rate-pressure product for the peak heart rate and blood pressure was 23944  There was no chest pain during stress  The stress test was terminated due to protocol completion  Pre oxygen saturation: 92 %  Peak oxygen saturation: 94 %  The stress ECG was negative for ischemia and normal  There were no stress arrhythmias or conduction abnormalities  ISOTOPE ADMINISTRATION:  Resting isotope administration Stress isotope administration  Agent Tetrofosmin Tetrofosmin  Dose 10 5 mCi 31 7 mCi  Date 04/04/2017 04/04/2017    The radiopharmaceutical was injected at the peak effect of pharmacologic stress  MYOCARDIAL PERFUSION IMAGING:  The image quality was good  Rotating projection images reveal breast attenuation      PERFUSION DEFECTS:  -  There was a small, mildly severe, fixed myocardial perfusion defect of the anterior wall possibly due to attenuation from breast tissue  GATED SPECT:  The calculated left ventricular ejection fraction was 73 %  There was no left ventricular regional abnormality  SUMMARY:  -  Stress results: There was resting hypertension with an appropriate blood pressure response to stress  There was no chest pain during stress  -  ECG conclusions: The stress ECG was negative for ischemia and normal   -  Perfusion imaging: There was a small, mildly severe, fixed myocardial perfusion defect of the anterior wall possibly due to attenuation from breast tissue   -  Gated SPECT: The calculated left ventricular ejection fraction was 73 %  There was no left ventricular regional abnormality  IMPRESSIONS: Abnormal study after pharmacologic stress  There was a small infarct in the anterior region that may be due to breast attenuation artifact  Left ventricular systolic function was normal     Prepared and signed by    Attila Arnold DO  Signed 04/04/2017 16:47:37           Dr Carie Cevallos MD [facility]      "This note has been constructed using a voice recognition system  Therefore there may be syntax, spelling, and/or grammatical errors   Please call if you have any questions  "

## 2019-01-29 ENCOUNTER — OFFICE VISIT (OUTPATIENT)
Dept: CARDIOLOGY CLINIC | Facility: CLINIC | Age: 84
End: 2019-01-29
Payer: MEDICARE

## 2019-01-29 ENCOUNTER — TELEPHONE (OUTPATIENT)
Dept: CARDIOLOGY CLINIC | Facility: CLINIC | Age: 84
End: 2019-01-29

## 2019-01-29 VITALS
SYSTOLIC BLOOD PRESSURE: 124 MMHG | OXYGEN SATURATION: 96 % | HEART RATE: 76 BPM | BODY MASS INDEX: 33.49 KG/M2 | DIASTOLIC BLOOD PRESSURE: 70 MMHG | HEIGHT: 65 IN | WEIGHT: 201 LBS

## 2019-01-29 DIAGNOSIS — I10 BENIGN ESSENTIAL HYPERTENSION: ICD-10-CM

## 2019-01-29 DIAGNOSIS — E78.5 DYSLIPIDEMIA: ICD-10-CM

## 2019-01-29 DIAGNOSIS — I48.19 PERSISTENT ATRIAL FIBRILLATION (HCC): ICD-10-CM

## 2019-01-29 DIAGNOSIS — E03.4 HYPOTHYROIDISM DUE TO ACQUIRED ATROPHY OF THYROID: ICD-10-CM

## 2019-01-29 DIAGNOSIS — I35.1 MODERATE AORTIC REGURGITATION: ICD-10-CM

## 2019-01-29 DIAGNOSIS — I49.5 SICK SINUS SYNDROME (HCC): ICD-10-CM

## 2019-01-29 DIAGNOSIS — R06.00 DYSPNEA ON EXERTION: ICD-10-CM

## 2019-01-29 PROBLEM — I48.91 ATRIAL FIBRILLATION (HCC): Status: ACTIVE | Noted: 2019-01-29

## 2019-01-29 PROBLEM — R00.1 SINUS BRADYCARDIA: Status: RESOLVED | Noted: 2018-01-03 | Resolved: 2019-01-29

## 2019-01-29 PROCEDURE — 99215 OFFICE O/P EST HI 40 MIN: CPT | Performed by: INTERNAL MEDICINE

## 2019-01-29 PROCEDURE — 93000 ELECTROCARDIOGRAM COMPLETE: CPT | Performed by: INTERNAL MEDICINE

## 2019-01-29 RX ORDER — ASPIRIN 325 MG
325 TABLET ORAL DAILY
COMMUNITY
End: 2019-01-29

## 2019-01-29 RX ORDER — WARFARIN SODIUM 5 MG/1
5 TABLET ORAL
Qty: 30 TABLET | Refills: 3 | Status: SHIPPED | OUTPATIENT
Start: 2019-01-29

## 2019-01-30 DIAGNOSIS — I48.91 ATRIAL FIBRILLATION, UNSPECIFIED TYPE (HCC): Primary | ICD-10-CM

## 2019-02-04 ENCOUNTER — TRANSCRIBE ORDERS (OUTPATIENT)
Dept: LAB | Facility: CLINIC | Age: 84
End: 2019-02-04

## 2019-02-08 ENCOUNTER — APPOINTMENT (OUTPATIENT)
Dept: LAB | Facility: CLINIC | Age: 84
End: 2019-02-08
Payer: MEDICARE

## 2019-02-13 ENCOUNTER — APPOINTMENT (OUTPATIENT)
Dept: LAB | Facility: CLINIC | Age: 84
End: 2019-02-13
Payer: MEDICARE

## 2019-02-13 LAB
INR PPP: 3.69 (ref 0.86–1.17)
INR PPP: 3.69 (ref 0.86–1.17)
PROTHROMBIN TIME: 36.6 SECONDS (ref 11.8–14.2)

## 2019-02-13 PROCEDURE — 85610 PROTHROMBIN TIME: CPT

## 2019-02-13 PROCEDURE — 36415 COLL VENOUS BLD VENIPUNCTURE: CPT

## 2019-02-14 ENCOUNTER — ANTICOAG VISIT (OUTPATIENT)
Dept: CARDIOLOGY CLINIC | Facility: CLINIC | Age: 84
End: 2019-02-14

## 2019-02-14 DIAGNOSIS — I48.19 PERSISTENT ATRIAL FIBRILLATION (HCC): ICD-10-CM

## 2019-02-18 ENCOUNTER — APPOINTMENT (OUTPATIENT)
Dept: LAB | Facility: CLINIC | Age: 84
End: 2019-02-18
Payer: MEDICARE

## 2019-02-19 ENCOUNTER — ANTICOAG VISIT (OUTPATIENT)
Dept: CARDIOLOGY CLINIC | Facility: CLINIC | Age: 84
End: 2019-02-19

## 2019-02-19 DIAGNOSIS — I48.19 PERSISTENT ATRIAL FIBRILLATION (HCC): ICD-10-CM

## 2019-02-25 ENCOUNTER — APPOINTMENT (OUTPATIENT)
Dept: LAB | Facility: CLINIC | Age: 84
End: 2019-02-25
Payer: MEDICARE

## 2019-02-26 ENCOUNTER — ANTICOAG VISIT (OUTPATIENT)
Dept: CARDIOLOGY CLINIC | Facility: CLINIC | Age: 84
End: 2019-02-26

## 2019-02-26 DIAGNOSIS — I48.19 PERSISTENT ATRIAL FIBRILLATION (HCC): ICD-10-CM

## 2019-03-05 ENCOUNTER — TELEPHONE (OUTPATIENT)
Dept: CARDIOLOGY CLINIC | Facility: CLINIC | Age: 84
End: 2019-03-05

## 2019-03-05 DIAGNOSIS — I48.91 ATRIAL FIBRILLATION, UNSPECIFIED TYPE (HCC): Primary | ICD-10-CM

## 2019-03-05 RX ORDER — WARFARIN SODIUM 2 MG/1
TABLET ORAL
Qty: 45 TABLET | Refills: 11 | Status: SHIPPED | OUTPATIENT
Start: 2019-03-05

## 2019-03-05 NOTE — TELEPHONE ENCOUNTER
Requesting Dr Khushbu Jauregui to send 30 day refill to [facility] for Warfarin 2 mg

## 2019-03-07 ENCOUNTER — TRANSCRIBE ORDERS (OUTPATIENT)
Dept: LAB | Facility: CLINIC | Age: 84
End: 2019-03-07

## 2019-03-07 ENCOUNTER — APPOINTMENT (OUTPATIENT)
Dept: LAB | Facility: CLINIC | Age: 84
End: 2019-03-07
Payer: MEDICARE

## 2019-03-08 ENCOUNTER — ANTICOAG VISIT (OUTPATIENT)
Dept: CARDIOLOGY CLINIC | Facility: CLINIC | Age: 84
End: 2019-03-08

## 2019-03-08 DIAGNOSIS — I48.19 PERSISTENT ATRIAL FIBRILLATION (HCC): ICD-10-CM

## 2019-03-21 ENCOUNTER — APPOINTMENT (OUTPATIENT)
Dept: LAB | Facility: CLINIC | Age: 84
End: 2019-03-21
Payer: MEDICARE

## 2019-03-21 ENCOUNTER — TRANSCRIBE ORDERS (OUTPATIENT)
Dept: LAB | Facility: CLINIC | Age: 84
End: 2019-03-21

## 2019-03-22 ENCOUNTER — ANTICOAG VISIT (OUTPATIENT)
Dept: CARDIOLOGY CLINIC | Facility: CLINIC | Age: 84
End: 2019-03-22

## 2019-03-22 DIAGNOSIS — I48.19 PERSISTENT ATRIAL FIBRILLATION (HCC): ICD-10-CM

## 2019-04-11 ENCOUNTER — APPOINTMENT (OUTPATIENT)
Dept: LAB | Facility: CLINIC | Age: 84
End: 2019-04-11
Payer: MEDICARE

## 2019-04-11 ENCOUNTER — TRANSCRIBE ORDERS (OUTPATIENT)
Dept: LAB | Facility: CLINIC | Age: 84
End: 2019-04-11

## 2019-04-12 ENCOUNTER — ANTICOAG VISIT (OUTPATIENT)
Dept: CARDIOLOGY CLINIC | Facility: CLINIC | Age: 84
End: 2019-04-12

## 2019-04-12 DIAGNOSIS — I48.19 PERSISTENT ATRIAL FIBRILLATION (HCC): ICD-10-CM

## 2019-05-09 ENCOUNTER — APPOINTMENT (OUTPATIENT)
Dept: LAB | Facility: CLINIC | Age: 84
End: 2019-05-09
Payer: MEDICARE

## 2019-05-09 ENCOUNTER — ANTICOAG VISIT (OUTPATIENT)
Dept: CARDIOLOGY CLINIC | Facility: CLINIC | Age: 84
End: 2019-05-09

## 2019-05-09 ENCOUNTER — OFFICE VISIT (OUTPATIENT)
Dept: CARDIOLOGY CLINIC | Facility: CLINIC | Age: 84
End: 2019-05-09
Payer: MEDICARE

## 2019-05-09 VITALS
HEART RATE: 72 BPM | DIASTOLIC BLOOD PRESSURE: 64 MMHG | HEIGHT: 65 IN | SYSTOLIC BLOOD PRESSURE: 100 MMHG | OXYGEN SATURATION: 100 % | BODY MASS INDEX: 33.32 KG/M2 | WEIGHT: 200 LBS

## 2019-05-09 DIAGNOSIS — I35.1 MODERATE AORTIC REGURGITATION: ICD-10-CM

## 2019-05-09 DIAGNOSIS — I48.19 PERSISTENT ATRIAL FIBRILLATION (HCC): ICD-10-CM

## 2019-05-09 DIAGNOSIS — I10 BENIGN ESSENTIAL HYPERTENSION: ICD-10-CM

## 2019-05-09 DIAGNOSIS — R06.00 DYSPNEA ON EXERTION: ICD-10-CM

## 2019-05-09 DIAGNOSIS — E03.4 HYPOTHYROIDISM DUE TO ACQUIRED ATROPHY OF THYROID: ICD-10-CM

## 2019-05-09 DIAGNOSIS — I49.5 SICK SINUS SYNDROME (HCC): ICD-10-CM

## 2019-05-09 DIAGNOSIS — E78.5 DYSLIPIDEMIA: ICD-10-CM

## 2019-05-09 PROCEDURE — 93000 ELECTROCARDIOGRAM COMPLETE: CPT | Performed by: INTERNAL MEDICINE

## 2019-05-09 PROCEDURE — 99214 OFFICE O/P EST MOD 30 MIN: CPT | Performed by: INTERNAL MEDICINE

## 2019-06-06 ENCOUNTER — APPOINTMENT (OUTPATIENT)
Dept: LAB | Facility: CLINIC | Age: 84
End: 2019-06-06
Payer: MEDICARE

## 2019-06-06 DIAGNOSIS — I48.91 ATRIAL FIBRILLATION, UNSPECIFIED TYPE (HCC): ICD-10-CM

## 2019-06-06 LAB
INR PPP: 2.71 (ref 0.86–1.17)
PROTHROMBIN TIME: 28.8 SECONDS (ref 11.8–14.2)

## 2019-06-06 PROCEDURE — 36415 COLL VENOUS BLD VENIPUNCTURE: CPT

## 2019-06-06 PROCEDURE — 85610 PROTHROMBIN TIME: CPT

## 2019-06-07 ENCOUNTER — ANTICOAG VISIT (OUTPATIENT)
Dept: CARDIOLOGY CLINIC | Facility: CLINIC | Age: 84
End: 2019-06-07

## 2019-06-07 DIAGNOSIS — I48.19 PERSISTENT ATRIAL FIBRILLATION (HCC): ICD-10-CM

## 2019-06-13 ENCOUNTER — HOSPITAL ENCOUNTER (OUTPATIENT)
Dept: NON INVASIVE DIAGNOSTICS | Facility: HOSPITAL | Age: 84
Discharge: HOME | End: 2019-06-13
Attending: INTERNAL MEDICINE
Payer: MEDICARE

## 2019-06-13 DIAGNOSIS — I35.1 MODERATE AORTIC REGURGITATION: ICD-10-CM

## 2019-06-13 DIAGNOSIS — I48.19 PERSISTENT ATRIAL FIBRILLATION (HCC): ICD-10-CM

## 2019-06-13 PROCEDURE — 93306 TTE W/DOPPLER COMPLETE: CPT | Performed by: INTERNAL MEDICINE

## 2019-06-13 PROCEDURE — 93306 TTE W/DOPPLER COMPLETE: CPT

## 2019-06-14 ENCOUNTER — TELEPHONE (OUTPATIENT)
Dept: CARDIOLOGY CLINIC | Facility: CLINIC | Age: 84
End: 2019-06-14

## 2019-07-01 ENCOUNTER — LAB (OUTPATIENT)
Dept: LAB | Facility: CLINIC | Age: 84
End: 2019-07-01
Payer: MEDICARE

## 2019-07-01 ENCOUNTER — TRANSCRIBE ORDERS (OUTPATIENT)
Dept: LAB | Facility: CLINIC | Age: 84
End: 2019-07-01

## 2019-07-02 ENCOUNTER — ANTICOAG VISIT (OUTPATIENT)
Dept: CARDIOLOGY CLINIC | Facility: CLINIC | Age: 84
End: 2019-07-02

## 2019-07-02 DIAGNOSIS — I48.19 PERSISTENT ATRIAL FIBRILLATION (HCC): ICD-10-CM

## 2019-07-29 ENCOUNTER — APPOINTMENT (OUTPATIENT)
Dept: LAB | Facility: CLINIC | Age: 84
End: 2019-07-29
Payer: MEDICARE

## 2019-07-30 ENCOUNTER — ANTICOAG VISIT (OUTPATIENT)
Dept: CARDIOLOGY CLINIC | Facility: CLINIC | Age: 84
End: 2019-07-30

## 2019-07-30 DIAGNOSIS — I48.19 PERSISTENT ATRIAL FIBRILLATION (HCC): ICD-10-CM

## 2019-08-26 ENCOUNTER — APPOINTMENT (OUTPATIENT)
Dept: LAB | Facility: CLINIC | Age: 84
End: 2019-08-26
Payer: MEDICARE

## 2019-08-27 ENCOUNTER — ANTICOAG VISIT (OUTPATIENT)
Dept: CARDIOLOGY CLINIC | Facility: CLINIC | Age: 84
End: 2019-08-27

## 2019-08-27 DIAGNOSIS — I48.19 PERSISTENT ATRIAL FIBRILLATION (HCC): ICD-10-CM

## 2019-09-17 ENCOUNTER — TRANSCRIBE ORDERS (OUTPATIENT)
Dept: PHYSICAL THERAPY | Facility: CLINIC | Age: 84
End: 2019-09-17

## 2019-09-17 ENCOUNTER — EVALUATION (OUTPATIENT)
Dept: PHYSICAL THERAPY | Facility: CLINIC | Age: 84
End: 2019-09-17
Payer: MEDICARE

## 2019-09-17 DIAGNOSIS — R26.89 ABNORMALITY OF GAIT DUE TO IMPAIRMENT OF BALANCE: ICD-10-CM

## 2019-09-17 DIAGNOSIS — Z91.81 STATUS POST FALL: Primary | ICD-10-CM

## 2019-09-17 PROCEDURE — 97162 PT EVAL MOD COMPLEX 30 MIN: CPT | Performed by: PHYSICAL THERAPIST

## 2019-09-17 NOTE — PROGRESS NOTES
PT Evaluation     Today's date: 2019  Patient name: Cristian Cameron  : 1935  MRN: [id number]  Referring provider: SHARMAINE Will  Dx:   Encounter Diagnosis     ICD-10-CM    1  Status post fall Z91 81    2  Abnormality of gait due to impairment of balance R26 89        Start Time: 906  Stop Time: 50  Total time in clinic (min): 44 minutes    Assessment  Assessment details: Cristian Cameron is a pleasant 82 y o  female who presents with gait and balance impairment with frequent falls  The patient's greatest concerns are worry over not knowing what's wrong and fear of not being able to keep active  Patient has comorbidities that contribute to primary impairment, and may benefit from an assistive device long-term for safety  No further referral appears necessary at this time based upon examination results  Primary movement impairment diagnosis of balance impairment resulting in pathoanatomical symptoms of Status post fall  (primary encounter diagnosis)  Abnormality of gait due to impairment of balance and limiting her ability to get out of a chair, lift, perform household chores, perform yard work, squat to  objects from the floor, stand, turn to look over shoulder and walk  Impairments include:  1) Poor static balance  2) Poor dynamic balance  3) Weakness in bilateral hips and knees  4) Gait dysfunction    Etiologic factors include repetitive poor body mechanics, poor lower extremity strength, poor hip mobility and poor balance  Discussed risks, benefits, and alternatives to treatment, and answered all patient questions to patient satisfaction    Impairments: abnormal gait, abnormal muscle firing, abnormal muscle tone, abnormal or restricted ROM, abnormal movement, activity intolerance, impaired balance, impaired physical strength, lacks appropriate home exercise program, pain with function and poor posture   Understanding of Dx/Px/POC: good   Prognosis: good  Prognosis details: Positive prognostic indicators include positive attitude toward recovery and good understanding of diagnosis and treatment plan options  Negative prognostic indicators include chronicity of symptoms, hypertension, high symptom irritability, multiple concurrent orthopedic problems, multiple prior failed treatments, obesity and dependency on medications  Goals  Impairment Goals 4-6 weeks  - Increase hip strength to 4/5 throughout  - Increase core strength to be able to sit straight up without deviation  - Patient will improve mCTSIB by >0 1  - Patient will improve TUG to < 13 seconds to demonstrate decreased risk for falls  - Patient will improve 4 step balance test to >30/40 to demonstrate improved static balance    Functional Goals 6-8 weeks  - Return to Prior Level of Function  - Increase Functional Status Measure (FOTO) to: >61  - Patient will be independent with HEP  - Patient will be able to ascend/descend a curb without pain/compensation/difficulty  - Patient will be able to perform sit to stand without increased pain/compensation/difficulty   - Patient will be able to walk over uneven surfaces without increased pain/compensation/difficulty   - Patient will score > 70% on ABC to demonstrate improved confidence during functional activities      Plan  Plan details: Prognosis above is given PT services 2x/week tapering to 1x/week over the next 3 months and home program adherence    Patient would benefit from: skilled physical therapy  Planned modality interventions: cryotherapy, electrical stimulation/Russian stimulation, high voltage pulsed current: pain management, high voltage pulsed current: spasm management, H-Wave, TENS, thermotherapy: hydrocollator packs and unattended electrical stimulation  Planned therapy interventions: abdominal trunk stabilization, behavior modification, body mechanics training, breathing training, flexibility, functional ROM exercises, home exercise program, joint mobilization, manual therapy, massage, Givens taping, muscle pump exercises, neuromuscular re-education, patient education, postural training, strengthening, stretching, therapeutic activities, therapeutic exercise, therapeutic training, balance, gait training and transfer training  Frequency: 2x week  Duration in weeks: 12  Treatment plan discussed with: patient        Subjective Evaluation    History of Present Illness  Mechanism of injury: WORK/SCHOOL: Retired  HOME LIFE: Home with her , 4 ROMEO + 1 step into house, ranch home, full flight into the basement  Sometimes clumbs these steps with 2 railings  HOBBIES/EXERCISE: Patient enjoys knitting, vincent, cooking, watching the news  Patient does not exercise regularly, enjoys gardening  PLOF: Patient has had recurrent falls  HISTORY OF CURRENT INJURY: Patient fell last year coming out of a grocery store, and she slipped and broke her L arm  She has not fallen since then  She had therapy for the shoulder at that time  1 week ago, patient went to her family's house, and patient tripped over the step into the kitchen and fell face first into the door jam and onto the floor  Patient hit her head and face, tried to brace herself with R arm  She also hit with bilateral knees, mostly on the L  The family called [facility], but they did not send an ambulance  She did not lose consciousness  She was able to transfer in and out of the truck and walk out of the house with support, and went into the ER  Patient had CT imaging of her head, and did not find any bleeding or fractures  Patient is scheduled to see a neurologist due to a finding in her frontal lobe  Imaging is not available for review and patient does not remember what they found  Patient went home that day  Patient does not have any s/s of concussion  She reports some difficulty concentrating and with memory that is longstanding  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    PAIN LOCATION/DESCRIPTORS: Bruising over face, head, and L knee hurts to the touch now, but not at rest     AGGRAVATING FACTORS:  Curb, stairs without railing, uneven surfaces  EASES: Turning lights on, holding on, cane on occasion  DAY PATTERN: None  IMAGING:  CT of head showed a finding that patient cannot remember  SPECIAL QUESTIONS:    Ivana Emanuel denies a new onset of Dizziness, Dysphagia, Dysarthria, Drop attacks, Diplopia, Nausea, Recent unexplained weight loss, Clumsy or unsteadiness and Constant night pain  Patient has a hx of cancer, breast CA in , now clear  PATIENT GOALS: Patient wishes to improve her balance and strength so that she is able to be more confident in daily activities      Pain  Current pain ratin  At best pain ratin  At worst pain rating: 3  Quality: dull ache and discomfort  Progression: worsening    Patient Goals  Patient goals for therapy: improved balance, increased motion, increased strength and independence with ADLs/IADLs          Objective     Palpation     Additional Palpation Details  Patient with bruising around L eye and temporal bone, L knee and shin, and small bruise on R knee    Strength/Myotome Testing     Left Hip   Planes of Motion   Flexion: 3+  External rotation: 3  Internal rotation: 4-    Right Hip   Planes of Motion   Flexion: 3+  External rotation: 3  Internal rotation: 4-    Left Knee   Flexion: 4-  Extension: 4    Right Knee   Flexion: 4-  Extension: 4    Left Ankle/Foot   Dorsiflexion: 4    Right Ankle/Foot   Dorsiflexion: 4    General Comments:      Lumbar Comments  Ambulation: Bilateral trendelenberg, short stride, wide MONALISA, slow turns  4 step balance test: 31/40  TU sec, loss of balance turning, self corrected  30 sec STS: 7 reps, use of legs on chair for leverage  MCTSIB: sway 2 03      Flowsheet Rows      Most Recent Value   PT/OT G-Codes   Current Score  45   Projected Score  61   FOTO information reviewed  Yes             Diagnosis: Balance impairment, weakness in latoya hips   Precautions: FALL RISK   Asterisks next to exercises = provided for patient HEP   Manual Therapy 9/17                                               Exercise Diary  9/17       Jose Pandey        3 way SLR        TA        TA with marches        Bridges        Standing hip abd/ext        Mini squats        Step ups        Gait training        Biodex mCTSIB = 2 03       SLS         Wobble board                                                                Modalities

## 2019-09-17 NOTE — LETTER
2019    SHARMAINE Robert  [address] [facility] [address] [facility] [address]    Patient: Nicole David   YOB: 1935   Date of Visit: 2019     Encounter Diagnosis     ICD-10-CM    1  Status post fall Z91 81    2  Abnormality of gait due to impairment of balance R26 89        Dear Dr Betsy Samuel: Thank you for your recent referral of Nicole David  Please review the attached evaluation summary from Olivia's recent visit  Please verify that you agree with the plan of care by signing the attached order  If you have any questions or concerns, please do not hesitate to call  I sincerely appreciate the opportunity to share in the care of one of your patients and hope to have another opportunity to work with you in the near future  Sincerely,    Ana Lilia Ewing, PT      Referring Provider:      I certify that I have read the below Plan of Care and certify the need for these services furnished under this plan of treatment while under my care  SHARMAINE Robert  [address] [facility] [address] [facility] [address]  VIA Facsimile: [phone]          PT Evaluation     Today's date: 2019  Patient name: Nicole David  : 1935  MRN: [id number]  Referring provider: SHARMAINE Hatch  Dx:   Encounter Diagnosis     ICD-10-CM    1  Status post fall Z91 81    2  Abnormality of gait due to impairment of balance R26 89        Start Time: 906  Stop Time: 0950  Total time in clinic (min): 44 minutes    Assessment  Assessment details: Nicole David is a pleasant 82 y o  female who presents with gait and balance impairment with frequent falls  The patient's greatest concerns are worry over not knowing what's wrong and fear of not being able to keep active  Patient has comorbidities that contribute to primary impairment, and may benefit from an assistive device long-term for safety  No further referral appears necessary at this time based upon examination results      Primary movement impairment diagnosis of balance impairment resulting in pathoanatomical symptoms of Status post fall  (primary encounter diagnosis)  Abnormality of gait due to impairment of balance and limiting her ability to get out of a chair, lift, perform household chores, perform yard work, squat to  objects from the floor, stand, turn to look over shoulder and walk  Impairments include:  1) Poor static balance  2) Poor dynamic balance  3) Weakness in bilateral hips and knees  4) Gait dysfunction    Etiologic factors include repetitive poor body mechanics, poor lower extremity strength, poor hip mobility and poor balance  Discussed risks, benefits, and alternatives to treatment, and answered all patient questions to patient satisfaction  Impairments: abnormal gait, abnormal muscle firing, abnormal muscle tone, abnormal or restricted ROM, abnormal movement, activity intolerance, impaired balance, impaired physical strength, lacks appropriate home exercise program, pain with function and poor posture   Understanding of Dx/Px/POC: good   Prognosis: good  Prognosis details: Positive prognostic indicators include positive attitude toward recovery and good understanding of diagnosis and treatment plan options  Negative prognostic indicators include chronicity of symptoms, hypertension, high symptom irritability, multiple concurrent orthopedic problems, multiple prior failed treatments, obesity and dependency on medications      Goals  Impairment Goals 4-6 weeks  - Increase hip strength to 4/5 throughout  - Increase core strength to be able to sit straight up without deviation  - Patient will improve mCTSIB by >0 1  - Patient will improve TUG to < 13 seconds to demonstrate decreased risk for falls  - Patient will improve 4 step balance test to >30/40 to demonstrate improved static balance    Functional Goals 6-8 weeks  - Return to Prior Level of Function  - Increase Functional Status Measure (FOTO) to: >61  - Patient will be independent with HEP  - Patient will be able to ascend/descend a curb without pain/compensation/difficulty  - Patient will be able to perform sit to stand without increased pain/compensation/difficulty   - Patient will be able to walk over uneven surfaces without increased pain/compensation/difficulty   - Patient will score > 70% on ABC to demonstrate improved confidence during functional activities      Plan  Plan details: Prognosis above is given PT services 2x/week tapering to 1x/week over the next 3 months and home program adherence  Patient would benefit from: skilled physical therapy  Planned modality interventions: cryotherapy, electrical stimulation/Russian stimulation, high voltage pulsed current: pain management, high voltage pulsed current: spasm management, H-Wave, TENS, thermotherapy: hydrocollator packs and unattended electrical stimulation  Planned therapy interventions: abdominal trunk stabilization, behavior modification, body mechanics training, breathing training, flexibility, functional ROM exercises, home exercise program, joint mobilization, manual therapy, massage, Givens taping, muscle pump exercises, neuromuscular re-education, patient education, postural training, strengthening, stretching, therapeutic activities, therapeutic exercise, therapeutic training, balance, gait training and transfer training  Frequency: 2x week  Duration in weeks: 12  Treatment plan discussed with: patient        Subjective Evaluation    History of Present Illness  Mechanism of injury: WORK/SCHOOL: Retired  HOME LIFE: Home with her , 4 ROME[address] + 1 step into house, ranch home, full flight into the basement  Sometimes clumbs these steps with 2 railings  HOBBIES/EXERCISE: Patient enjoys knitting, vincent, cooking, watching the news   Patient does not exercise regularly, enjoys gardening  PLOF: Patient has had recurrent falls  HISTORY OF CURRENT INJURY: Patient fell last year coming out of a grocery store, and she slipped and broke her L arm  She has not fallen since then  She had therapy for the shoulder at that time  1 week ago, patient went to her family's house, and patient tripped over the step into the kitchen and fell face first into the door jam and onto the floor  Patient hit her head and face, tried to brace herself with R arm  She also hit with bilateral knees, mostly on the L  The family called 46, but they did not send an ambulance  She did not lose consciousness  She was able to transfer in and out of the truck and walk out of the house with support, and went into the ER  Patient had CT imaging of her head, and did not find any bleeding or fractures  Patient is scheduled to see a neurologist due to a finding in her frontal lobe  Imaging is not available for review and patient does not remember what they found  Patient went home that day  Patient does not have any s/s of concussion  She reports some difficulty concentrating and with memory that is longstanding  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  PAIN LOCATION/DESCRIPTORS: Bruising over face, head, and L knee hurts to the touch now, but not at rest     AGGRAVATING FACTORS:  Curb, stairs without railing, uneven surfaces  EASES: Turning lights on, holding on, cane on occasion  DAY PATTERN: None  IMAGING:  CT of head showed a finding that patient cannot remember  SPECIAL QUESTIONS:    [facility] denies a new onset of Dizziness, Dysphagia, Dysarthria, Drop attacks, Diplopia, Nausea, Recent unexplained weight loss, Clumsy or unsteadiness and Constant night pain  Patient has a hx of cancer, breast CA in , now clear  PATIENT GOALS: Patient wishes to improve her balance and strength so that she is able to be more confident in daily activities      Pain  Current pain ratin  At best pain ratin  At worst pain rating: 3  Quality: dull ache and discomfort  Progression: worsening    Patient Goals  Patient goals for therapy: improved balance, increased motion, increased strength and independence with ADLs/IADLs          Objective     Palpation     Additional Palpation Details  Patient with bruising around L eye and temporal bone, L knee and shin, and small bruise on R knee    Strength/Myotome Testing     Left Hip   Planes of Motion   Flexion: 3+  External rotation: 3  Internal rotation: 4-    Right Hip   Planes of Motion   Flexion: 3+  External rotation: 3  Internal rotation: 4-    Left Knee   Flexion: 4-  Extension: 4    Right Knee   Flexion: 4-  Extension: 4    Left Ankle/Foot   Dorsiflexion: 4    Right Ankle/Foot   Dorsiflexion: 4    General Comments:      Lumbar Comments  Ambulation: Bilateral trendelenberg, short stride, wide MONALISA, slow turns  4 step balance test:   TU sec, loss of balance turning, self corrected  30 sec STS: 7 reps, use of legs on chair for leverage  MCTSIB: sway 2 03      Flowsheet Rows      Most Recent Value   PT/OT G-Codes   Current Score  45   Projected Score  61   FOTO information reviewed  Yes             Diagnosis: Balance impairment, weakness in latoya hips   Precautions: FALL RISK   Asterisks next to exercises = provided for patient HEP   Manual Therapy                                                Exercise Diary         Bike        Dannie        3 way SLR        TA        TA with Recognition PRO        Bridges        Standing hip abd/ext        Mini squats        Step ups        Gait training        Biodex mCTSIB = 2 03       SLS         Wobble board                                                                Modalities

## 2019-09-23 ENCOUNTER — APPOINTMENT (OUTPATIENT)
Dept: LAB | Facility: CLINIC | Age: 84
End: 2019-09-23
Payer: MEDICARE

## 2019-09-23 ENCOUNTER — OFFICE VISIT (OUTPATIENT)
Dept: PHYSICAL THERAPY | Facility: CLINIC | Age: 84
End: 2019-09-23
Payer: MEDICARE

## 2019-09-23 DIAGNOSIS — R26.89 ABNORMALITY OF GAIT DUE TO IMPAIRMENT OF BALANCE: Primary | ICD-10-CM

## 2019-09-23 DIAGNOSIS — Z91.81 STATUS POST FALL: ICD-10-CM

## 2019-09-23 PROCEDURE — 97112 NEUROMUSCULAR REEDUCATION: CPT | Performed by: PHYSICAL THERAPIST

## 2019-09-23 PROCEDURE — 97110 THERAPEUTIC EXERCISES: CPT | Performed by: PHYSICAL THERAPIST

## 2019-09-23 PROCEDURE — 97530 THERAPEUTIC ACTIVITIES: CPT | Performed by: PHYSICAL THERAPIST

## 2019-09-23 NOTE — PROGRESS NOTES
Daily Note     Today's date: 2019  Patient name: Cristian Cameron  : 1935  MRN: [id number]  Referring provider: SHARMAINE Will  Dx:   Encounter Diagnosis     ICD-10-CM    1  Abnormality of gait due to impairment of balance R26 89    2  Status post fall Z91 81        Start Time: 1045  Stop Time: 1138  Total time in clinic (min): 53 minutes    Subjective: Patient reports that in the back of her legs she has occasional pain, and she is having difficulty with SLS during putting on pants  Objective: See treatment diary below    Assessment: Tolerated treatment well and was provided HEP for hip strengthening and TA activation  Patient demonstrated fatigue post treatment and session was focused today on baseline strengthening with progression into balance training anticipated next visit,    Plan: Progress treatment as tolerated         Diagnosis: Balance impairment, weakness in latoya hips   Precautions: FALL RISK   Asterisks next to exercises = provided for patient HEP   Manual Therapy                                               Exercise Diary        Bike  5 min      Clamshells*  2x10 AROM      3 way SLR*  2x10 SLR      TA*  X 20 with TA and kegel      LTR  5 sec holds x 10      TA with marches        Bridges        Standing hip abd/ext        Mini squats        Step ups  4" step 2 x 10 ea leg leading      Gait training        Biodex mCTSIB = 2 03       SLS         Wobble board        Standing marches  2x10      Standing HS curls  2x10      EOT resisted hip flx                                        Modalities

## 2019-09-24 ENCOUNTER — ANTICOAG VISIT (OUTPATIENT)
Dept: CARDIOLOGY CLINIC | Facility: CLINIC | Age: 84
End: 2019-09-24

## 2019-09-24 ENCOUNTER — OFFICE VISIT (OUTPATIENT)
Dept: PHYSICAL THERAPY | Facility: CLINIC | Age: 84
End: 2019-09-24
Payer: MEDICARE

## 2019-09-24 DIAGNOSIS — I48.19 PERSISTENT ATRIAL FIBRILLATION (HCC): ICD-10-CM

## 2019-09-24 DIAGNOSIS — Z91.81 STATUS POST FALL: ICD-10-CM

## 2019-09-24 DIAGNOSIS — R26.89 ABNORMALITY OF GAIT DUE TO IMPAIRMENT OF BALANCE: Primary | ICD-10-CM

## 2019-09-24 PROCEDURE — 97110 THERAPEUTIC EXERCISES: CPT | Performed by: PHYSICAL THERAPIST

## 2019-09-24 PROCEDURE — 97530 THERAPEUTIC ACTIVITIES: CPT | Performed by: PHYSICAL THERAPIST

## 2019-09-24 PROCEDURE — 97112 NEUROMUSCULAR REEDUCATION: CPT | Performed by: PHYSICAL THERAPIST

## 2019-09-24 NOTE — PROGRESS NOTES
Daily Note     Today's date: 2019  Patient name: Dawn Cosme  : 1935  MRN: [id number]  Referring provider: SHARMAINE Licea  Dx:   Encounter Diagnosis     ICD-10-CM    1  Abnormality of gait due to impairment of balance R26 89    2  Status post fall Z91 81        Start Time: 1200  Stop Time: 1253  Total time in clinic (min): 53 minutes    Subjective: Patient reports that he muscles are sore from PT yesterday, especially on the back of the legs  Objective: See treatment diary below    Assessment: Tolerated treatment well and session focused on core stability and balance, as patient is sore today from strengthening last session  Patient demonstrated fatigue post treatment and exhibited good technique with therapeutic exercises, and demonstrates improved TA activation this date with cues for breathing  Patient had bilateral knee pain and ice was used for symptom relief following session  Plan: Progress treatment as tolerated         Diagnosis: Balance impairment, weakness in latoya hips   Precautions: FALL RISK   Asterisks next to exercises = provided for patient HEP   Manual Therapy                                              Exercise Diary       Bike  5 min 5 min     Clamshells*  2x10 AROM      3 way SLR*  2x10 SLR      TA*  X 20 with TA and kegel X 20 TA and kegel, 20 sec x 5 prolonged holds     LTR  5 sec holds x 10      3 way ball rolls   X 5 reps ea direction     TA with marches        Bridges        Standing hip abd/ext        Mini squats        Step ups  4" step 2 x 10 ea leg leading      Gait training   Over hurdles heel toe x 2 laps at barre, sideways x 2 laps at barre     Biodex mCTSIB = 2 03  LOS two hands static x 2 best 72%, one hand 40%, maze x 2 two hands best 80%, random control 2 hands 1 min 91%     SLS         Wobble board        Standing marches  2x10      Standing HS curls  2x10      EOT resisted hip flx Modalities        Ice   Bilateral knees 10 min

## 2019-09-30 ENCOUNTER — OFFICE VISIT (OUTPATIENT)
Dept: PHYSICAL THERAPY | Facility: CLINIC | Age: 84
End: 2019-09-30
Payer: MEDICARE

## 2019-09-30 DIAGNOSIS — Z91.81 STATUS POST FALL: ICD-10-CM

## 2019-09-30 DIAGNOSIS — R26.89 ABNORMALITY OF GAIT DUE TO IMPAIRMENT OF BALANCE: Primary | ICD-10-CM

## 2019-09-30 PROCEDURE — 97110 THERAPEUTIC EXERCISES: CPT | Performed by: PHYSICAL THERAPIST

## 2019-09-30 PROCEDURE — 97112 NEUROMUSCULAR REEDUCATION: CPT | Performed by: PHYSICAL THERAPIST

## 2019-09-30 NOTE — PROGRESS NOTES
Daily Note     Today's date: 2019  Patient name: Sofya Koenig  : 1935  MRN: [id number]  Referring provider: SHARMAINE Fletcher  Dx:   Encounter Diagnosis     ICD-10-CM    1  Abnormality of gait due to impairment of balance R26 89    2  Status post fall Z91 81        Start Time: 1445  Stop Time: 1530  Total time in clinic (min): 45 minutes    Subjective: Patient reports that she had a busy weekend, and spend 3 hours this morning pulling weeds  She feels tired now at PT  Objective: See treatment diary below    Assessment: Tolerated treatment well and demonstrated improvement in Biodex balance training  Patient demonstrated fatigue post treatment and exhibited good technique with therapeutic exercises  [place] the [place] and will return for the next visit next week  FOTO demonstrates improvement in overall function  Plan: Continue per plan of care        Diagnosis: Balance impairment, weakness in latoya hips   Precautions: FALL RISK   Asterisks next to exercises = provided for patient HEP   Manual Therapy                                             Exercise Diary      Bike  5 min 5 min 5 min    Clamshells*  2x10 AROM      3 way SLR*  2x10 SLR  3x10 SLR    TA*  X 20 with TA and kegel X 20 TA and kegel, 20 sec x 5 prolonged holds     LTR  5 sec holds x 10      3 way ball rolls   X 5 reps ea direction     TA with marches        Bridges    2x10     Standing hip abd/ext        Mini squats        Step ups  4" step 2 x 10 ea leg leading  6" step x 10 ea leg leading    Gait training   Over hurdles heel toe x 2 laps at barre, sideways x 2 laps at barre Over hurdles heel toe x 2 laps at barre, sideways x 2 laps at barre    Biodex mCTSIB = 2 03  LOS two hands static x 2 best 72%, one hand 40%, maze x 2 two hands best 80%, random control 2 hands 1 min 91% LOS one hand static x 3 best 65%   maze x 2 one hand best 60%, random control 1 hand 2 min SLS         Wobble board        Standing marches  2x10      Standing HS curls  2x10      EOT resisted hip flx    2x10 latoya                                    Modalities        Ice   Bilateral knees 10 min

## 2019-10-02 ENCOUNTER — APPOINTMENT (OUTPATIENT)
Dept: LAB | Facility: CLINIC | Age: 84
End: 2019-10-02
Payer: MEDICARE

## 2019-10-02 ENCOUNTER — ANTICOAG VISIT (OUTPATIENT)
Dept: CARDIOLOGY CLINIC | Facility: CLINIC | Age: 84
End: 2019-10-02

## 2019-10-02 DIAGNOSIS — I48.91 ATRIAL FIBRILLATION, UNSPECIFIED TYPE (HCC): ICD-10-CM

## 2019-10-02 LAB
INR PPP: 2.3 (ref 0.84–1.19)
PROTHROMBIN TIME: 24.8 SECONDS (ref 11.6–14.5)

## 2019-10-02 PROCEDURE — 36415 COLL VENOUS BLD VENIPUNCTURE: CPT

## 2019-10-02 PROCEDURE — 85610 PROTHROMBIN TIME: CPT

## 2019-10-03 ENCOUNTER — APPOINTMENT (OUTPATIENT)
Dept: PHYSICAL THERAPY | Facility: CLINIC | Age: 84
End: 2019-10-03
Payer: MEDICARE

## 2019-10-07 ENCOUNTER — OFFICE VISIT (OUTPATIENT)
Dept: PHYSICAL THERAPY | Facility: CLINIC | Age: 84
End: 2019-10-07
Payer: MEDICARE

## 2019-10-07 DIAGNOSIS — Z91.81 STATUS POST FALL: ICD-10-CM

## 2019-10-07 DIAGNOSIS — R26.89 ABNORMALITY OF GAIT DUE TO IMPAIRMENT OF BALANCE: Primary | ICD-10-CM

## 2019-10-07 PROCEDURE — 97110 THERAPEUTIC EXERCISES: CPT

## 2019-10-07 PROCEDURE — 97112 NEUROMUSCULAR REEDUCATION: CPT

## 2019-10-07 NOTE — PROGRESS NOTES
Daily Note     Today's date: 10/7/2019  Patient name: Nerissa Crane  : 1935  MRN: [id number]  Referring provider: SHARMAINE Fitzgerald  Dx:   Encounter Diagnosis     ICD-10-CM    1  Abnormality of gait due to impairment of balance R26 89    2  Status post fall Z91 81        Start Time: 1450  Stop Time: 1535  Total time in clinic (min): 45 minutes    Subjective: Patient reports that her balance has been good  She notes improvements secondary to PT  No reported falls  Objective: See treatment diary below    Assessment: Patient demonstrated improvements with the biodex exercises  She did not require guarding during balance activities because she utilized one hand on the rail  No complaints of pain and no LOB this visit  Went over HEP to assure good carryover at home  Verbal cueing required to improve form of clamshells (decrease lumbar rotation)  Patient unable to maintain R knee ext secondary to "being bone on bone"  Plan: Continue per plan of care        Diagnosis: Balance impairment, weakness in latoya hips   Precautions: FALL RISK   Asterisks next to exercises = provided for patient HEP   Manual Therapy 9/17 9/23 9/24 9/30 10/7                                           Exercise Diary  9/17 9/23 9/24 9/30 10/7   Bike  5 min 5 min 5 min 5 min   Clamshells*  2x10 AROM   x10   3 way SLR*  2x10 SLR  3x10 SLR 3x10 SLR   TA*  X 20 with TA and kegel X 20 TA and kegel, 20 sec x 5 prolonged holds  5"x10   LTR  5 sec holds x 10      3 way ball rolls   X 5 reps ea direction     TA with marches        Bridges    2x10  2x10   Standing hip abd/ext        Mini squats        Step ups  4" step 2 x 10 ea leg leading  6" step x 10 ea leg leading 6"x15 ea   Gait training   Over hurdles heel toe x 2 laps at barre, sideways x 2 laps at barre Over hurdles heel toe x 2 laps at barre, sideways x 2 laps at barre Over hurdles heel toe  2 laps at bar    Over hurdles  Sideways  2 laps at bar   Biodex mCTSIB = 2 03  LOS two hands static x 2 best 72%, one hand 40%, maze x 2 two hands best 80%, random control 2 hands 1 min 91% LOS one hand static x 3 best 65%   maze x 2 one hand best 60%, random control 1 hand 2 min  LOS   Static 3x  One hand Best 70%    Maze 2x One hand Best 82%    Random control  One hand  2 min   Best 80%         SLS         Wobble board        Standing marches  2x10   2x10   Standing HS curls  2x10      EOT resisted hip flx    2x10 latoya                                    Modalities        Ice   Bilateral knees 10 min

## 2019-10-08 ENCOUNTER — OFFICE VISIT (OUTPATIENT)
Dept: PHYSICAL THERAPY | Facility: CLINIC | Age: 84
End: 2019-10-08
Payer: MEDICARE

## 2019-10-08 DIAGNOSIS — R26.89 ABNORMALITY OF GAIT DUE TO IMPAIRMENT OF BALANCE: Primary | ICD-10-CM

## 2019-10-08 DIAGNOSIS — Z91.81 STATUS POST FALL: ICD-10-CM

## 2019-10-08 PROCEDURE — 97110 THERAPEUTIC EXERCISES: CPT

## 2019-10-08 PROCEDURE — 97112 NEUROMUSCULAR REEDUCATION: CPT

## 2019-10-08 NOTE — PROGRESS NOTES
Daily Note     Today's date: 10/8/2019  Patient name: Lake Parikh  : 1935  MRN: [id number]  Referring provider: SHARMAINE Cadet  Dx:   Encounter Diagnosis     ICD-10-CM    1  Abnormality of gait due to impairment of balance R26 89    2  Status post fall Z91 81        Subjective: Patient reports she felt sore this am after last PT session yesterday  She states she can tell she worked her muscles good  Objective: See treatment diary below    Assessment: Pt tolerated treatment session well  Pt was challenged with biodex this session with notable decrease in scores compared to LV  Pt required gait belt use with fabricio exercises due to only slight fingertip support throughout exercise  Pt was challenged but able to complete without significant LOB  Lower reps preformed this session to due back to back PT sessions and pt doing a lot of work at home prior to session  Resume full reps NV  Fatigue and mild muscle soreness present post session  Plan: Continue per plan of care      Pt 1:1 with PTA JW 2:00-2:45     Diagnosis: Balance impairment, weakness in latoya hips    Precautions: FALL RISK    Asterisks next to exercises = provided for patient HEP    Manual Therapy 9/17 9/23 9/24 9/30 10/7 10/8                                                Exercise Diary  9/17 9/23 9/24 9/30 10/7 10/8   Bike  5 min 5 min 5 min 5 min 5 min    Clamshells*  2x10 AROM   x10 np   3 way SLR*  2x10 SLR  3x10 SLR 3x10 SLR 2x10 SLR   TA*  X 20 with TA and kegel X 20 TA and kegel, 20 sec x 5 prolonged holds  5"x10 5"x15   LTR  5 sec holds x 10       3 way ball rolls   X 5 reps ea direction      TA with marches         Bridges    2x10  2x10 2x10   Standing hip abd/ext         Mini squats         Step ups  4" step 2 x 10 ea leg leading  6" step x 10 ea leg leading 6"x15 ea 6" x 10 ea   Gait training   Over hurdles heel toe x 2 laps at barre, sideways x 2 laps at barre Over hurdles heel toe x 2 laps at barre, sideways x 2 laps at barre Over hurdles heel toe  2 laps at bar    Over hurdles  Sideways  2 laps at bar Over hurdles heel toe  2 laps at bar    Over hurdles  Sideways  2 laps at bar   Biodex mCTSIB = 2 03  LOS two hands static x 2 best 72%, one hand 40%, maze x 2 two hands best 80%, random control 2 hands 1 min 91% LOS one hand static x 3 best 65%   maze x 2 one hand best 60%, random control 1 hand 2 min  LOS   Static 3x  One hand Best 70%    Maze 2x One hand Best 82%    Random control  One hand  2 min   Best 80%       LOS   Static 3x  One hand Best 43%    Maze 2x One hand Best 71%    Random control  One hand  2 min   Best 80%      SLS          Wobble board         Standing marches  2x10   2x10 2x10   Standing HS curls  2x10       EOT resisted hip flx    2x10 latoya                                         Modalities         Ice   Bilateral knees 10 min

## 2019-10-11 ENCOUNTER — APPOINTMENT (OUTPATIENT)
Dept: PHYSICAL THERAPY | Facility: CLINIC | Age: 84
End: 2019-10-11
Payer: MEDICARE

## 2019-10-15 ENCOUNTER — TRANSCRIBE ORDERS (OUTPATIENT)
Dept: PHYSICAL THERAPY | Facility: CLINIC | Age: 84
End: 2019-10-15

## 2019-10-15 ENCOUNTER — EVALUATION (OUTPATIENT)
Dept: PHYSICAL THERAPY | Facility: CLINIC | Age: 84
End: 2019-10-15
Payer: MEDICARE

## 2019-10-15 DIAGNOSIS — R26.89 ABNORMALITY OF GAIT DUE TO IMPAIRMENT OF BALANCE: Primary | ICD-10-CM

## 2019-10-15 DIAGNOSIS — Z91.81 STATUS POST FALL: ICD-10-CM

## 2019-10-15 PROCEDURE — 97112 NEUROMUSCULAR REEDUCATION: CPT | Performed by: PHYSICAL THERAPIST

## 2019-10-15 NOTE — PROGRESS NOTES
PT Re-Evaluation     Today's date: 10/15/2019  Patient name: Venessa Steele  : 1935  MRN: [id number]  Referring provider: SHARMAINE Delacruz  Dx:   Encounter Diagnosis     ICD-10-CM    1  Abnormality of gait due to impairment of balance R26 89    2  Status post fall Z91 81        Start Time: 1000  Stop Time: 1040  Total time in clinic (min): 40 minutes    Assessment  Assessment details: Venessa Steele is a pleasant 82 y o  female who presents with gait and balance impairment with frequent falls  Patient has made improvements in overall LE strength and balance evidenced by improved mCTSIB score and 30 sec STS repetitions  Patient continues to demonstrate decreased hip strength, inability to SLS more than one second, and TUG of > 15 seconds indicating high risk for falls  Patient would benefit from continued PT to address remaining impairments to return to PLOF  Primary movement impairment diagnosis of balance impairment resulting in pathoanatomical symptoms of Status post fall  (primary encounter diagnosis)  Abnormality of gait due to impairment of balance and limiting her ability to get out of a chair, lift, perform household chores, perform yard work, squat to  objects from the floor, stand, turn to look over shoulder and walk  Impairments include:  1) Poor static balance  2) Poor dynamic balance  3) Weakness in bilateral hips and knees  4) Gait dysfunction    Etiologic factors include repetitive poor body mechanics, poor lower extremity strength, poor hip mobility and poor balance  Discussed risks, benefits, and alternatives to treatment, and answered all patient questions to patient satisfaction    Impairments: abnormal gait, abnormal muscle firing, abnormal muscle tone, abnormal or restricted ROM, abnormal movement, activity intolerance, impaired balance, impaired physical strength, lacks appropriate home exercise program, pain with function and poor posture   Understanding of Dx/Px/POC: good Prognosis: good  Prognosis details: Positive prognostic indicators include positive attitude toward recovery and good understanding of diagnosis and treatment plan options  Negative prognostic indicators include chronicity of symptoms, hypertension, high symptom irritability, multiple concurrent orthopedic problems, multiple prior failed treatments, obesity and dependency on medications  Goals  Impairment Goals 4-6 weeks  - Increase hip strength to 4/5 throughout - partially met  - Increase core strength to be able to sit straight up without deviation - partially met  - Patient will improve mCTSIB by >0 1 - met  - Patient will improve TUG to < 13 seconds to demonstrate decreased risk for falls - partially met  - Patient will improve 4 step balance test to >30/40 to demonstrate improved static balance - met  - Improve SLS to > 5 seconds on each leg to improve stair climbing  Functional Goals 6-8 weeks  - Return to Prior Level of Function - partially met  - Increase Functional Status Measure (FOTO) to: >61 - partially met  - Patient will be independent with HEP - met  - Patient will be able to ascend/descend a curb without pain/compensation/difficulty - partially met, modified with HHA  - Patient will be able to perform sit to stand without increased pain/compensation/difficulty - partially met  - Patient will be able to walk over uneven surfaces without increased pain/compensation/difficulty - partially met  - Patient will score > 70% on ABC to demonstrate improved confidence during functional activities - partially met      Plan  Plan details: Prognosis above is given PT services 2x/week tapering to 1x/week over the next 2 months and home program adherence    Patient would benefit from: skilled physical therapy  Planned modality interventions: cryotherapy, electrical stimulation/Russian stimulation, high voltage pulsed current: pain management, high voltage pulsed current: spasm management, H-Wave, TENS, thermotherapy: hydrocollator packs and unattended electrical stimulation  Planned therapy interventions: abdominal trunk stabilization, behavior modification, body mechanics training, breathing training, flexibility, functional ROM exercises, home exercise program, joint mobilization, manual therapy, massage, Givens taping, muscle pump exercises, neuromuscular re-education, patient education, postural training, strengthening, stretching, therapeutic activities, therapeutic exercise, therapeutic training, balance, gait training and transfer training  Frequency: 2x week  Duration in weeks: 8  Treatment plan discussed with: patient        Subjective Evaluation    History of Present Illness  Mechanism of injury: WORK/SCHOOL: Retired  HOME LIFE: Home with her , 4 ROMEO + 1 step into house, ranch home, full flight into the basement  Sometimes clumbs these steps with 2 railings  HOBBIES/EXERCISE: Patient enjoys knitting, vincent, cooking, watching the news  Patient does not exercise regularly, enjoys gardening  PLOF: Patient has had recurrent falls    Re-evaluation  HISTORY OF CURRENT INJURY: Patient reports that since she started therapy she has noticed improvements in her ability to go up and down stairs  She does notice that her L leg does not lift high sometimes and causes her to trip  She reports that her knees are bad and wishes to get TKR bilaterally, but is on Warfarin so this is a discussion to have with the Cardiologist  Patient reports that her balance has been pretty good  She has been weeding and working in the garden a lot and doesn't have much difficulty with this  Patient has extended railing for all curbs and steps at home    PAIN LOCATION/DESCRIPTORS: No pain  AGGRAVATING FACTORS:  Curb, stairs without 2 railings  Improved: uneven surfaces outdoors, cleaning the house  EASES: Turning lights on, holding on, walking stick  DAY PATTERN: None  IMAGING:  CT of head showed a finding that patient cannot remember  SPECIAL QUESTIONS:    Dario Adam denies a new onset of Dizziness, Dysphagia, Dysarthria, Drop attacks, Diplopia, Nausea, Recent unexplained weight loss, Clumsy or unsteadiness and Constant night pain  Patient has a hx of cancer, breast CA in , now clear  PATIENT GOALS: Patient wishes to improve her balance and strength so that she is able to be more confident in daily activities  - Patient is 75% improved at this time, and she feels she needs to work on her strength       Pain  No pain reported  Current pain ratin  At best pain ratin  At worst pain ratin  Progression: improved    Patient Goals  Patient goals for therapy: improved balance, increased motion, increased strength and independence with ADLs/IADLs          Objective     Strength/Myotome Testing     Left Hip   Planes of Motion   Flexion: 3+  External rotation: 3+  Internal rotation: 4    Right Hip   Planes of Motion   Flexion: 4-  External rotation: 3+  Internal rotation: 4    Left Knee   Flexion: 4  Extension: 4    Right Knee   Flexion: 4  Extension: 4    Left Ankle/Foot   Dorsiflexion: 4    Right Ankle/Foot   Dorsiflexion: 4    General Comments:      Lumbar Comments  Ambulation: Bilateral trendelenberg, short stride, wide MONALISA, slow turns   4 step balance test: 32/40   TUG: 15 sec  30 sec STS: 11 reps no hands, some knee pain  MCTSIB: sway 1 62              Diagnosis: Balance impairment, weakness in latoya hips    Precautions: FALL RISK    Asterisks next to exercises = provided for patient HEP    Manual Therapy 10/15 9/23 9/24 9/30 10/7 10/8                                                Exercise Diary  10/15 9/23 9/24 9/30 10/7 10/8   Bike 5 min 5 min 5 min 5 min 5 min 5 min    Clamshells*  2x10 AROM   x10 np   3 way SLR*  2x10 SLR  3x10 SLR 3x10 SLR 2x10 SLR   TA*  X 20 with TA and kegel X 20 TA and kegel, 20 sec x 5 prolonged holds  5"x10 5"x15   LTR  5 sec holds x 10       3 way ball rolls   X 5 reps ea direction      TA with joseph Bridges    2x10  2x10 2x10   Standing hip abd/ext         Mini squats         Step ups  4" step 2 x 10 ea leg leading  6" step x 10 ea leg leading 6"x15 ea 6" x 10 ea   Gait training   Over hurdles heel toe x 2 laps at barre, sideways x 2 laps at barre Over hurdles heel toe x 2 laps at barre, sideways x 2 laps at barre Over hurdles heel toe  2 laps at bar    Over hurdles  Sideways  2 laps at bar Over hurdles heel toe  2 laps at bar    Over hurdles  Sideways  2 laps at bar   Biodex mCTSIB 1 62  LOS two hands static x 2 best 72%, one hand 40%, maze x 2 two hands best 80%, random control 2 hands 1 min 91% LOS one hand static x 3 best 65%   maze x 2 one hand best 60%, random control 1 hand 2 min  LOS   Static 3x  One hand Best 70%    Maze 2x One hand Best 82%    Random control  One hand  2 min   Best 80%       LOS   Static 3x  One hand Best 43%    Maze 2x One hand Best 71%    Random control  One hand  2 min   Best 80%      SLS          Wobble board         Standing marches  2x10   2x10 2x10   Standing HS curls  2x10       EOT resisted hip flx    2x10 latoya                                Re-evaluation Performed        Modalities         Ice   Bilateral knees 10 min

## 2019-10-15 NOTE — LETTER
October 15, 2019    Alexa Blevins, [address] [facility] [address]    Patient: Lake Parikh   YOB: 1935   Date of Visit: 10/15/2019     Encounter Diagnosis     ICD-10-CM    1  Abnormality of gait due to impairment of balance R26 89    2  Status post fall Z91 81        Dear Dr Nicholas Padilla: Thank you for your recent referral of Lake Parikh  Please review the attached evaluation summary from Olivia's recent visit  Please verify that you agree with the plan of care by signing the attached order  If you have any questions or concerns, please do not hesitate to call  I sincerely appreciate the opportunity to share in the care of one of your patients and hope to have another opportunity to work with you in the near future  Sincerely,    Violeta Mendoza, PT      Referring Provider:      I certify that I have read the below Plan of Care and certify the need for these services furnished under this plan of treatment while under my care  SHARMAINE Pittman  [address] [facility] Male [address]  VIA Facsimile: [phone]          PT Re-Evaluation     Today's date: 10/15/2019  Patient name: Lake Parikh  : 1935  MRN: [id number]  Referring provider: SHARMAINE Cadet  Dx:   Encounter Diagnosis     ICD-10-CM    1  Abnormality of gait due to impairment of balance R26 89    2  Status post fall Z91 81        Start Time: 1000  Stop Time: 1040  Total time in clinic (min): 40 minutes    Assessment  Assessment details: Lake Parikh is a pleasant 82 y o  female who presents with gait and balance impairment with frequent falls  Patient has made improvements in overall LE strength and balance evidenced by improved mCTSIB score and 30 sec STS repetitions  Patient continues to demonstrate decreased hip strength, inability to SLS more than one second, and TUG of > 15 seconds indicating high risk for falls   Patient would benefit from continued PT to address remaining impairments to return to PLOF     Primary movement impairment diagnosis of balance impairment resulting in pathoanatomical symptoms of Status post fall  (primary encounter diagnosis)  Abnormality of gait due to impairment of balance and limiting her ability to get out of a chair, lift, perform household chores, perform yard work, squat to  objects from the floor, stand, turn to look over shoulder and walk  Impairments include:  1) Poor static balance  2) Poor dynamic balance  3) Weakness in bilateral hips and knees  4) Gait dysfunction    Etiologic factors include repetitive poor body mechanics, poor lower extremity strength, poor hip mobility and poor balance  Discussed risks, benefits, and alternatives to treatment, and answered all patient questions to patient satisfaction  Impairments: abnormal gait, abnormal muscle firing, abnormal muscle tone, abnormal or restricted ROM, abnormal movement, activity intolerance, impaired balance, impaired physical strength, lacks appropriate home exercise program, pain with function and poor posture   Understanding of Dx/Px/POC: good   Prognosis: good  Prognosis details: Positive prognostic indicators include positive attitude toward recovery and good understanding of diagnosis and treatment plan options  Negative prognostic indicators include chronicity of symptoms, hypertension, high symptom irritability, multiple concurrent orthopedic problems, multiple prior failed treatments, obesity and dependency on medications      Goals  Impairment Goals 4-6 weeks  - Increase hip strength to 4/5 throughout - partially met  - Increase core strength to be able to sit straight up without deviation - partially met  - Patient will improve mCTSIB by >0 1 - met  - Patient will improve TUG to < 13 seconds to demonstrate decreased risk for falls - partially met  - Patient will improve 4 step balance test to >30/40 to demonstrate improved static balance - met  - Improve SLS to > 5 seconds on each leg to improve stair climbing  Functional Goals 6-8 weeks  - Return to Prior Level of Function - partially met  - Increase Functional Status Measure (FOTO) to: >61 - partially met  - Patient will be independent with HEP - met  - Patient will be able to ascend/descend a curb without pain/compensation/difficulty - partially met, modified with HHA  - Patient will be able to perform sit to stand without increased pain/compensation/difficulty - partially met  - Patient will be able to walk over uneven surfaces without increased pain/compensation/difficulty - partially met  - Patient will score > 70% on ABC to demonstrate improved confidence during functional activities - partially met      Plan  Plan details: Prognosis above is given PT services 2x/week tapering to 1x/week over the next 2 months and home program adherence  Patient would benefit from: skilled physical therapy  Planned modality interventions: cryotherapy, electrical stimulation/Russian stimulation, high voltage pulsed current: pain management, high voltage pulsed current: spasm management, H-Wave, TENS, thermotherapy: hydrocollator packs and unattended electrical stimulation  Planned therapy interventions: abdominal trunk stabilization, behavior modification, body mechanics training, breathing training, flexibility, functional ROM exercises, home exercise program, joint mobilization, manual therapy, massage, Givens taping, muscle pump exercises, neuromuscular re-education, patient education, postural training, strengthening, stretching, therapeutic activities, therapeutic exercise, therapeutic training, balance, gait training and transfer training  Frequency: 2x week  Duration in weeks: 8  Treatment plan discussed with: patient        Subjective Evaluation    History of Present Illness  Mechanism of injury: WORK/SCHOOL: Retired  HOME LIFE: Home with her , 4 ROMEO + 1 step into house, ranch home, full flight into the basement   Sometimes clumbs these steps with 2 railings  HOBBIES/EXERCISE: Patient enjoys knitting, vincent, cooking, watching the news  Patient does not exercise regularly, enjoys gardening  PLOF: Patient has had recurrent falls    Re-evaluation  HISTORY OF CURRENT INJURY: Patient reports that since she started therapy she has noticed improvements in her ability to go up and down stairs  She does notice that her L leg does not lift high sometimes and causes her to trip  She reports that her knees are bad and wishes to get TKR bilaterally, but is on Warfarin so this is a discussion to have with the Cardiologist  Patient reports that her balance has been pretty good  She has been weeding and working in the garden a lot and doesn't have much difficulty with this  Patient has extended railing for all curbs and steps at home  PAIN LOCATION/DESCRIPTORS: No pain  AGGRAVATING FACTORS:  Curb, stairs without 2 railings  Improved: uneven surfaces outdoors, cleaning the house  EASES: Turning lights on, holding on, walking stick  DAY PATTERN: None  IMAGING:  CT of head showed a finding that patient cannot remember  SPECIAL QUESTIONS:    Otoniel Ervin denies a new onset of Dizziness, Dysphagia, Dysarthria, Drop attacks, Diplopia, Nausea, Recent unexplained weight loss, Clumsy or unsteadiness and Constant night pain  Patient has a hx of cancer, breast CA in , now clear  PATIENT GOALS: Patient wishes to improve her balance and strength so that she is able to be more confident in daily activities  - Patient is 75% improved at this time, and she feels she needs to work on her strength       Pain  No pain reported  Current pain ratin  At best pain ratin  At worst pain ratin  Progression: improved    Patient Goals  Patient goals for therapy: improved balance, increased motion, increased strength and independence with ADLs/IADLs          Objective     Strength/Myotome Testing     Left Hip   Planes of Motion   Flexion: 3+  External rotation: 3+  Internal rotation: 4    Right Hip   Planes of Motion   Flexion: 4-  External rotation: 3+  Internal rotation: 4    Left Knee   Flexion: 4  Extension: 4    Right Knee   Flexion: 4  Extension: 4    Left Ankle/Foot   Dorsiflexion: 4    Right Ankle/Foot   Dorsiflexion: 4    General Comments:      Lumbar Comments  Ambulation: Bilateral trendelenberg, short stride, wide MONALISA, slow turns   4 step balance test: 32/40   TUG: 15 sec  30 sec STS: 11 reps no hands, some knee pain  MCTSIB: sway 1 62              Diagnosis: Balance impairment, weakness in latoya hips    Precautions: FALL RISK    Asterisks next to exercises = provided for patient HEP    Manual Therapy 10/15 9/23 9/24 9/30 10/7 10/8                                                Exercise Diary  10/15 9/23 9/24 9/30 10/7 10/8   Bike 5 min 5 min 5 min 5 min 5 min 5 min    Clamshells*  2x10 AROM   x10 np   3 way SLR*  2x10 SLR  3x10 SLR 3x10 SLR 2x10 SLR   TA*  X 20 with TA and kegel X 20 TA and kegel, 20 sec x 5 prolonged holds  5"x10 5"x15   LTR  5 sec holds x 10       3 way ball rolls   X 5 reps ea direction      TA with marches         Bridges    2x10  2x10 2x10   Standing hip abd/ext         Mini squats         Step ups  4" step 2 x 10 ea leg leading  6" step x 10 ea leg leading 6"x15 ea 6" x 10 ea   Gait training   Over hurdles heel toe x 2 laps at barre, sideways x 2 laps at barre Over hurdles heel toe x 2 laps at barre, sideways x 2 laps at barre Over hurdles heel toe  2 laps at bar    Over hurdles  Sideways  2 laps at bar Over hurdles heel toe  2 laps at bar    Over hurdles  Sideways  2 laps at bar   Biodex mCTSIB 1 62  LOS two hands static x 2 best 72%, one hand 40%, maze x 2 two hands best 80%, random control 2 hands 1 min 91% LOS one hand static x 3 best 65%   maze x 2 one hand best 60%, random control 1 hand 2 min  LOS   Static 3x  One hand Best 70%    Maze 2x One hand Best 82%    Random control  One hand  2 min   Best 80%       LOS   Static 3x  One hand Best 43%    Maze 2x One hand Best 71%    Random control  One hand  2 min   Best 80%      SLS          Wobble board         Standing marches  2x10   2x10 2x10   Standing HS curls  2x10       EOT resisted hip flx    2x10 latoya                                Re-evaluation Performed        Modalities         Ice   Bilateral knees 10 min

## 2019-10-17 ENCOUNTER — OFFICE VISIT (OUTPATIENT)
Dept: PHYSICAL THERAPY | Facility: CLINIC | Age: 84
End: 2019-10-17
Payer: MEDICARE

## 2019-10-17 DIAGNOSIS — R26.89 ABNORMALITY OF GAIT DUE TO IMPAIRMENT OF BALANCE: Primary | ICD-10-CM

## 2019-10-17 DIAGNOSIS — Z91.81 STATUS POST FALL: ICD-10-CM

## 2019-10-17 PROCEDURE — 97530 THERAPEUTIC ACTIVITIES: CPT | Performed by: PHYSICAL THERAPIST

## 2019-10-17 PROCEDURE — 97116 GAIT TRAINING THERAPY: CPT | Performed by: PHYSICAL THERAPIST

## 2019-10-17 PROCEDURE — 97112 NEUROMUSCULAR REEDUCATION: CPT | Performed by: PHYSICAL THERAPIST

## 2019-10-17 NOTE — PROGRESS NOTES
Daily Note     Today's date: 10/17/2019  Patient name: Nerissa Cordero  : 1935  MRN: [id number]  Referring provider: SHARMAINE Medina  Dx:   Encounter Diagnosis     ICD-10-CM    1  Abnormality of gait due to impairment of balance R26 89    2  Status post fall Z91 81        Start Time: 1327  Stop Time: 1411  Total time in clinic (min): 44 minutes    Subjective: Patient reports she is feeling fine today  Patient walked up a hill with a walking stick and felt it in her knees and in her legs  Objective: See treatment diary below    Assessment: Tolerated treatment well and was challenged by progressions of balance and gait training  Patient demonstrated fatigue post treatment and exhibited good technique with therapeutic exercises  Plan: Continue per plan of care        Diagnosis: Balance impairment, weakness in latoya hips    Precautions: FALL RISK    Asterisks next to exercises = provided for patient HEP    Manual Therapy 10/15 10/17 9/24 9/30 10/7 10/8                                                Exercise Diary  10/15 10/17 9/24 9/30 10/7 10/8   Bike 5 min 5 min 5 min 5 min 5 min 5 min    Clamshells*     x10 np   3 way SLR*    3x10 SLR 3x10 SLR 2x10 SLR   TA*   X 20 TA and kegel, 20 sec x 5 prolonged holds  5"x10 5"x15   LTR         3 way ball rolls   X 5 reps ea direction      TA with marches         Bridges    2x10  2x10 2x10   Standing hip abd/ext  2x10 abd       Mini squats         Step ups  Step up hold 2" step x10 ea leg   4" x 10 ea leg  6" step x 10 ea leg leading 6"x15 ea 6" x 10 ea   Gait training  Over hurdles x 4 laps attempting no hands,  Over foam airex pad x 2 laps, gait with head turns x 5 min Over hurdles heel toe x 2 laps at barre, sideways x 2 laps at barre Over hurdles heel toe x 2 laps at barre, sideways x 2 laps at barre Over hurdles heel toe  2 laps at bar    Over hurdles  Sideways  2 laps at bar Over hurdles heel toe  2 laps at bar    Over hurdles  Sideways  2 laps at bar   Biodex mCTSIB 1 62 LOS   Static 3x  One hand Best 67%    Maze 2x One hand Best 78%    Random control  One hand  2 min   Best 88% LOS two hands static x 2 best 72%, one hand 40%, maze x 2 two hands best 80%, random control 2 hands 1 min 91% LOS one hand static x 3 best 65%   maze x 2 one hand best 60%, random control 1 hand 2 min  LOS   Static 3x  One hand Best 70%    Maze 2x One hand Best 82%    Random control  One hand  2 min   Best 80%       LOS   Static 3x  One hand Best 43%    Maze 2x One hand Best 71%    Random control  One hand  2 min   Best 80%      SLS          Wobble board         Standing marches     2x10 2x10   Standing HS curls         EOT resisted hip flx    2x10 latoya                                Re-evaluation Performed        Modalities         Ice   Bilateral knees 10 min

## 2019-10-21 ENCOUNTER — OFFICE VISIT (OUTPATIENT)
Dept: PHYSICAL THERAPY | Facility: CLINIC | Age: 84
End: 2019-10-21
Payer: MEDICARE

## 2019-10-21 DIAGNOSIS — Z91.81 STATUS POST FALL: ICD-10-CM

## 2019-10-21 DIAGNOSIS — R26.89 ABNORMALITY OF GAIT DUE TO IMPAIRMENT OF BALANCE: Primary | ICD-10-CM

## 2019-10-21 PROCEDURE — 97116 GAIT TRAINING THERAPY: CPT | Performed by: PHYSICAL THERAPIST

## 2019-10-21 PROCEDURE — 97112 NEUROMUSCULAR REEDUCATION: CPT | Performed by: PHYSICAL THERAPIST

## 2019-10-21 PROCEDURE — 97110 THERAPEUTIC EXERCISES: CPT | Performed by: PHYSICAL THERAPIST

## 2019-10-21 NOTE — PROGRESS NOTES
Daily Note     Today's date: 10/21/2019  Patient name: Venessa Steele  : 1935  MRN: [id number]  Referring provider: SHARMAINE Delacruz  Dx:   Encounter Diagnosis     ICD-10-CM    1  Abnormality of gait due to impairment of balance R26 89    2  Status post fall Z91 81        Start Time: 1001  Stop Time: 1045  Total time in clinic (min): 44 minutes    Subjective: Patient reports she was cooking all day yesterday and felt well  Objective: See treatment diary below    Assessment: Tolerated treatment well and was fatigued by increase in resistance for hip and knee strengthening  Patient exhibited good technique with therapeutic exercises and demonstrated improved balance during ambulation over foam     Plan: Continue per plan of care        Diagnosis: Balance impairment, weakness in latoya hips    Precautions: FALL RISK    Asterisks next to exercises = provided for patient HEP    Manual Therapy 10/15 10/17 10/21 9/30 10/7 10/8                                                Exercise Diary  10/15 10/17 10/21 9/30 10/7 10/8   Bike 5 min 5 min 5 min 5 min 5 min 5 min    Clamshells*     x10 np   3 way SLR*    3x10 SLR 3x10 SLR 2x10 SLR   TA*     5"x10 5"x15   LAQ   AROM 3x10      LTR         3 way ball rolls         TA with marches         Bridges    2x10  2x10 2x10   Standing hip abd/ext  2x10 abd       Mini squats         Step ups  Step up hold 2" step x10 ea leg   4" x 10 ea leg  6" step x 10 ea leg leading 6"x15 ea 6" x 10 ea   Gait training  Over hurdles x 4 laps attempting no hands,  Over foam airex pad x 2 laps, gait with head turns x 5 min Over hurdles x 3 laps attempting no hands, sideways one hand  Over foam airex pad x 2 laps Over hurdles heel toe x 2 laps at barre, sideways x 2 laps at barre Over hurdles heel toe  2 laps at bar    Over hurdles  Sideways  2 laps at bar Over hurdles heel toe  2 laps at bar    Over hurdles  Sideways  2 laps at bar   Biodex mCTSIB 1 62 LOS   Static 3x  One hand Best 67%    Maze 2x One hand Best 78%    Random control  One hand  2 min   Best 88%  LOS one hand static x 3 best 65%   maze x 2 one hand best 60%, random control 1 hand 2 min  LOS   Static 3x  One hand Best 70%    Maze 2x One hand Best 82%    Random control  One hand  2 min   Best 80%       LOS   Static 3x  One hand Best 43%    Maze 2x One hand Best 71%    Random control  One hand  2 min   Best 80%      SLS          Wobble board         Standing marches   3x10 3# weights  2x10 2x10   Standing HS curls   3x10 3# weights      EOT resisted hip flx   3x10 on L 2x10 on R 2x10 latoya                                Re-evaluation Performed        Modalities         Ice

## 2019-10-22 ENCOUNTER — OFFICE VISIT (OUTPATIENT)
Dept: PHYSICAL THERAPY | Facility: CLINIC | Age: 84
End: 2019-10-22
Payer: MEDICARE

## 2019-10-22 DIAGNOSIS — Z91.81 STATUS POST FALL: ICD-10-CM

## 2019-10-22 DIAGNOSIS — R26.89 ABNORMALITY OF GAIT DUE TO IMPAIRMENT OF BALANCE: Primary | ICD-10-CM

## 2019-10-22 PROCEDURE — 97112 NEUROMUSCULAR REEDUCATION: CPT | Performed by: PHYSICAL THERAPIST

## 2019-10-22 PROCEDURE — 97110 THERAPEUTIC EXERCISES: CPT | Performed by: PHYSICAL THERAPIST

## 2019-10-22 NOTE — PROGRESS NOTES
Daily Note     Today's date: 10/22/2019  Patient name: Yanique Cordero  : 1935  MRN: [id number]  Referring provider: SHARMAINE Wray  Dx:   Encounter Diagnosis     ICD-10-CM    1  Abnormality of gait due to impairment of balance R26 89    2  Status post fall Z91 81        Start Time: 1530  Stop Time: 1612  Total time in clinic (min): 42 minutes    Subjective: Patient reports she is very sore in leg muscles today since PT yesterday and gardening > 2 hours following PT  Objective: See treatment diary below    Assessment: Tolerated treatment fair and session was focused on balance rather than strengthening due to reports of soreness from yesterday's increases in resistance  Patient demonstrated fatigue post treatment and Pt added sitting HS stretch to address soreness in HS mm  Plan: Continue per plan of care        Diagnosis: Balance impairment, weakness in latoya hips    Precautions: FALL RISK    Asterisks next to exercises = provided for patient HEP    Manual Therapy 10/15 10/17 10/21 10/22 10/7 10/8                                                Exercise Diary  10/15 10/17 10/21 10/22 10/7 10/8   Bike 5 min 5 min 5 min 5 min 5 min 5 min    Clamshells*     x10 np   3 way SLR*     3x10 SLR 2x10 SLR   TA*     5"x10 5"x15   LAQ   AROM 3x10 AROM 3x10     LTR         3 way ball rolls         TA with marches         Bridges     2x10 2x10   Standing hip abd/ext  2x10 abd       Mini squats         Step ups  Step up hold 2" step x10 ea leg   4" x 10 ea leg   6"x15 ea 6" x 10 ea   Gait training  Over hurdles x 4 laps attempting no hands,  Over foam airex pad x 2 laps, gait with head turns x 5 min Over hurdles x 3 laps attempting no hands, sideways one hand  Over foam airex pad x 2 laps gait with head turns x 5 min Over hurdles heel toe  2 laps at bar    Over hurdles  Sideways  2 laps at bar Over hurdles heel toe  2 laps at bar    Over hurdles  Sideways  2 laps at bar   Biodex mCTSIB 1 62 LOS   Static 3x  One hand Best 67%    Maze 2x One hand Best 78%    Random control  One hand  2 min   Best 88%  LOS   Static 3x  One hand Best 81%, no hands best 39%    Maze 1x One hand Best 92%, no hands best 67%    Random control no hands 67% LOS   Static 3x  One hand Best 70%    Maze 2x One hand Best 82%    Random control  One hand  2 min   Best 80%       LOS   Static 3x  One hand Best 43%    Maze 2x One hand Best 71%    Random control  One hand  2 min   Best 80%      SLS          Wobble board         Standing marches   3x10 3# weights  2x10 2x10   Standing HS curls   3x10 3# weights      EOT resisted hip flx   3x10 on L 2x10 on R      Hs stretch seated    3x30 sec ea     Step up hold    No step x 10 ea leg     Ring toss on foam    X 4 attempts                                                  Re-evaluation Performed        Modalities         Ice

## 2019-10-23 ENCOUNTER — APPOINTMENT (OUTPATIENT)
Dept: LAB | Facility: CLINIC | Age: 84
End: 2019-10-23
Payer: MEDICARE

## 2019-10-24 ENCOUNTER — ANTICOAG VISIT (OUTPATIENT)
Dept: CARDIOLOGY CLINIC | Facility: CLINIC | Age: 84
End: 2019-10-24

## 2019-10-24 DIAGNOSIS — I48.91 ATRIAL FIBRILLATION, UNSPECIFIED TYPE (HCC): ICD-10-CM

## 2019-10-28 ENCOUNTER — OFFICE VISIT (OUTPATIENT)
Dept: PHYSICAL THERAPY | Facility: CLINIC | Age: 84
End: 2019-10-28
Payer: MEDICARE

## 2019-10-28 DIAGNOSIS — R26.89 ABNORMALITY OF GAIT DUE TO IMPAIRMENT OF BALANCE: Primary | ICD-10-CM

## 2019-10-28 DIAGNOSIS — Z91.81 STATUS POST FALL: ICD-10-CM

## 2019-10-28 PROCEDURE — 97112 NEUROMUSCULAR REEDUCATION: CPT | Performed by: PHYSICAL THERAPIST

## 2019-10-28 NOTE — PROGRESS NOTES
Daily Note     Today's date: 10/28/2019  Patient name: Eligio Orlando  : 1935  MRN: [id number]  Referring provider: SHARMAINE John  Dx:   Encounter Diagnosis     ICD-10-CM    1  Abnormality of gait due to impairment of balance R26 89    2  Status post fall Z91 81        Start Time: 1145  Stop Time: 1230  Total time in clinic (min): 45 minutes    Subjective: Patient reports that she was raking a lot of leaves yesterday and still feels good today  Objective: See treatment diary below    Assessment: Tolerated treatment well and session was divided between strenghtening and balance  She continues to be challenged by SLS balance which makes it challenging to ascend/descend stairs  Patient demonstrated fatigue post treatment and exhibited good technique with therapeutic exercises  Plan: Continue per plan of care        Diagnosis: Balance impairment, weakness in latoya hips    Precautions: FALL RISK    Asterisks next to exercises = provided for patient HEP    Manual Therapy 10/15 10/17 10/21 10/22 10/28 10/8                                                Exercise Diary  10/15 10/17 10/21 10/22 10/28 10/8   Bike 5 min 5 min 5 min 5 min 5 min 5 min    Clamshells*      np   3 way SLR*      2x10 SLR   TA*      5"x15   LAQ   AROM 3x10 AROM 3x10     LTR         3 way ball rolls         TA with marches         Bridges      2x10   Standing hip abd/ext  2x10 abd       Mini squats         Step ups  Step up hold 2" step x10 ea leg   4" x 10 ea leg    6" x 10 ea   Gait training  Over hurdles x 4 laps attempting no hands,  Over foam airex pad x 2 laps, gait with head turns x 5 min Over hurdles x 3 laps attempting no hands, sideways one hand  Over foam airex pad x 2 laps gait with head turns x 5 min  Over hurdles heel toe  2 laps at bar    Over hurdles  Sideways  2 laps at bar   Biodex mCTSIB 1 62 LOS   Static 3x  One hand Best 67%    Maze 2x One hand Best 78%    Random control  One hand  2 min   Best 88%  LOS   Static 3x  One hand Best 81%, no hands best 39%    Maze 1x One hand Best 92%, no hands best 67%    Random control no hands 67% LOS   Static 3x  One hand Best 68%, no hands best 40%    Maze 1x One hand Best 82%    Random control no hands 4 min 62% LOS   Static 3x  One hand Best 43%    Maze 2x One hand Best 71%    Random control  One hand  2 min   Best 80%      SLS          Wobble board         Standing marches   3x10 3# weights  3x10 3# 2x10   Standing HS curls   3x10 3# weights  3x10 3#    EOT resisted hip flx   3x10 on L 2x10 on R      Hs stretch seated    3x30 sec ea 10 x 10 sec holds    Step up hold    No step x 10 ea leg No step x 10 ea leg    Ring toss on foam    X 4 attempts     Heel raises     3x10 3#    Toe raises     3x10 3#                               Re-evaluation Performed        Modalities         Ice

## 2019-10-30 ENCOUNTER — OFFICE VISIT (OUTPATIENT)
Dept: PHYSICAL THERAPY | Facility: CLINIC | Age: 84
End: 2019-10-30
Payer: MEDICARE

## 2019-10-30 DIAGNOSIS — R26.89 ABNORMALITY OF GAIT DUE TO IMPAIRMENT OF BALANCE: Primary | ICD-10-CM

## 2019-10-30 DIAGNOSIS — Z91.81 STATUS POST FALL: ICD-10-CM

## 2019-10-30 PROCEDURE — 97112 NEUROMUSCULAR REEDUCATION: CPT | Performed by: PHYSICAL THERAPIST

## 2019-10-30 NOTE — PROGRESS NOTES
Daily Note     Today's date: 10/30/2019  Patient name: Skyler Sherwood  : 1935  MRN: [id number]  Referring provider: SHARMAINE Mccallum  Dx:   Encounter Diagnosis     ICD-10-CM    1  Abnormality of gait due to impairment of balance R26 89    2  Status post fall Z91 81        Start Time: 1130  Stop Time: 1210  Total time in clinic (min): 40 minutes    Subjective: Patient reports that her legs were hurting yesterday and the day before after the strengthening exercises but she is feeling better today  Objective: See treatment diary below    Assessment: Tolerated treatment well and was challenged by biodex without the use of hands for support  New HEP provided this date for strenghtening of lower extremities  Patient demonstrated fatigue post treatment and exhibited good technique with therapeutic exercises  Plan: Progress treatment as tolerated         Diagnosis: Balance impairment, weakness in latoya hips   Precautions: FALL RISK   Asterisks next to exercises = provided for patient HEP   Manual Therapy 10/30 10/17 10/21 10/22 10/28                                           Exercise Diary  10/30 10/17 10/21 10/22 10/28   Bike 5 min 5 min 5 min 5 min 5 min   Clamshells*        3 way SLR*        TA*        LAQ   AROM 3x10 AROM 3x10    LTR        3 way ball rolls        TA with marches        Bridges        Standing hip abd/ext  2x10 abd      Mini squats        Step ups  Step up hold 2" step x10 ea leg   4" x 10 ea leg      Gait training  Over hurdles x 4 laps attempting no hands,  Over foam airex pad x 2 laps, gait with head turns x 5 min Over hurdles x 3 laps attempting no hands, sideways one hand  Over foam airex pad x 2 laps gait with head turns x 5 min    Biodex LOS   Static 3x  No hands best 23%    Maze 2x No hands Best 50%    Random control no hands 2 min 63% LOS   Static 3x  One hand Best 67%    Maze 2x One hand Best 78%    Random control  One hand  2 min   Best 88%  LOS   Static 3x  One hand Best 81%, no hands best 39%    Maze 1x One hand Best 92%, no hands best 67%    Random control no hands 67% LOS   Static 3x  One hand Best 68%, no hands best 40%    Maze 1x One hand Best 82%    Random control no hands 4 min 62%   SLS         Wobble board 1 min ea       Standing marches   3x10 3# weights  3x10 3#   Standing HS curls   3x10 3# weights  3x10 3#   EOT resisted hip flx   3x10 on L 2x10 on R     Hs stretch seated 3x30 sec   3x30 sec ea 10 x 10 sec holds   Step up hold    No step x 10 ea leg No step x 10 ea leg   Ring toss on foam    X 4 attempts    Heel raises     3x10 3#   Toe raises     3x10 3#   Balance on foam X 30 sec tandem, feet together, EC on foam                       Re-evaluation        Modalities        Ice

## 2019-11-05 ENCOUNTER — OFFICE VISIT (OUTPATIENT)
Dept: PHYSICAL THERAPY | Facility: CLINIC | Age: 84
End: 2019-11-05
Payer: MEDICARE

## 2019-11-05 DIAGNOSIS — R26.89 ABNORMALITY OF GAIT DUE TO IMPAIRMENT OF BALANCE: Primary | ICD-10-CM

## 2019-11-05 DIAGNOSIS — Z91.81 STATUS POST FALL: ICD-10-CM

## 2019-11-05 PROCEDURE — 97112 NEUROMUSCULAR REEDUCATION: CPT | Performed by: PHYSICAL THERAPIST

## 2019-11-05 PROCEDURE — 97110 THERAPEUTIC EXERCISES: CPT | Performed by: PHYSICAL THERAPIST

## 2019-11-05 NOTE — PROGRESS NOTES
Daily Note     Today's date: 2019  Patient name: Nerissa Cordero  : 1935  MRN: [id number]  Referring provider: SHARMAINE Medina  Dx:   Encounter Diagnosis     ICD-10-CM    1  Abnormality of gait due to impairment of balance R26 89    2  Status post fall Z91 81        Start Time: 1047  Stop Time: 1132  Total time in clinic (min): 45 minutes    Subjective: Patient reports that she feels good today  She reports she did a lot of racking up leaves over the weekend  Patient feels she still has some balance issues but feels she can work on this at home as well  Objective: See treatment diary below    Assessment: Tolerated treatment well and noted improvement in sturdy sneakers compared to in her other shoes  Patient exhibited good technique with therapeutic exercises and would benefit from continued PT  Plan: Continue per plan of care        Diagnosis: Balance impairment, weakness in latoya hips   Precautions: FALL RISK   Asterisks next to exercises = provided for patient HEP   Manual Therapy 10/30 11/5 10/21 10/22 10/28                                           Exercise Diary  10/30 11/5 10/21 10/22 10/28   Bike 5 min 5 min 5 min 5 min 5 min   Clamshells*        3 way SLR*        TA*        LAQ   AROM 3x10 AROM 3x10    LTR        3 way ball rolls        TA with marches        Bridges        Standing hip abd/ext        Mini squats        Step ups        Gait training   Over hurdles x 3 laps attempting no hands, sideways one hand  Over foam airex pad x 2 laps gait with head turns x 5 min    Biodex LOS   Static 3x  No hands best 23%    Maze 2x No hands Best 50%    Random control no hands 2 min faster [place]  54% LOS   Static 3x  No hands best 38%    Maze 2x No hands Best 58%    Random control no hands 2 min 63%  LOS   Static 3x  One hand Best 81%, no hands best 39%    Maze 1x One hand Best 92%, no hands best 67%    Random control no hands 67% LOS   Static 3x  One hand Best 68%, no hands best 40%    Maze 1x One hand Best 82%    Random control no hands 4 min 62%   SLS         Wobble board 1 min ea 1 min 30 sec ea      Standing marches   3x10 3# weights  3x10 3#   Standing HS curls   3x10 3# weights  3x10 3#   EOT resisted hip flx   3x10 on L 2x10 on R     Hs stretch seated 3x30 sec   3x30 sec ea 10 x 10 sec holds   Step up hold  2" step x 10 ea leg  No step x 10 ea leg No step x 10 ea leg   Ring toss on foam    X 4 attempts    Heel raises  3x10 3#   3x10 3#   Toe raises  3x10 3#   3x10 3#   Balance on foam X 30 sec tandem, feet together, EC on foam 2 X 30 sec tandem, feet together                      Re-evaluation        Modalities        Ice

## 2019-11-07 ENCOUNTER — OFFICE VISIT (OUTPATIENT)
Dept: PHYSICAL THERAPY | Facility: CLINIC | Age: 84
End: 2019-11-07
Payer: MEDICARE

## 2019-11-07 DIAGNOSIS — Z91.81 STATUS POST FALL: ICD-10-CM

## 2019-11-07 DIAGNOSIS — R26.89 ABNORMALITY OF GAIT DUE TO IMPAIRMENT OF BALANCE: Primary | ICD-10-CM

## 2019-11-07 PROCEDURE — 97110 THERAPEUTIC EXERCISES: CPT | Performed by: PHYSICAL THERAPIST

## 2019-11-07 PROCEDURE — 97112 NEUROMUSCULAR REEDUCATION: CPT | Performed by: PHYSICAL THERAPIST

## 2019-11-07 PROCEDURE — 97530 THERAPEUTIC ACTIVITIES: CPT | Performed by: PHYSICAL THERAPIST

## 2019-11-07 NOTE — PROGRESS NOTES
Daily Note     Today's date: 2019  Patient name: Gi Sage  : 1935  MRN: [id number]  Referring provider: SHARMAINE Moulton  Dx:   Encounter Diagnosis     ICD-10-CM    1  Abnormality of gait due to impairment of balance R26 89    2  Status post fall Z91 81        Start Time: 1500  Stop Time: 1553  Total time in clinic (min): 53 minutes    Subjective: Patient reports her L knee hurts today because of the rain  She is wearing her sturdy shoes and wants to see if these help her balance better  Objective: See treatment diary below    Assessment: Tolerated treatment well and her scores on biodex were slightly less due to heel on her "sturdy" shoes that brings her weight forward onto her toes  Patient exhibited good technique with therapeutic exercises and was encouraged to wear whatever shoes she is comfortable in  Patient demonstrates continued progress towards her goals  Plan: Continue per plan of care        Diagnosis: Balance impairment, weakness in latoya hips   Precautions: FALL RISK   Asterisks next to exercises = provided for patient HEP   Manual Therapy 10/30 11/5 11/7 10/22 10/28                                           Exercise Diary  10/30 11/5 11/7 10/22 10/28   Bike 5 min 5 min 5 min 5 min 5 min   Clamshells*        3 way SLR*        TA*        LAQ    AROM 3x10    LTR        3 way ball rolls        TA with marches        Bridges        Standing hip abd/ext   2# 3x10 latoya     Mini squats        Step ups        Gait training    gait with head turns x 5 min    Biodex LOS   Static 3x  No hands best 23%    Maze 2x No hands Best 50%    Random control no hands 2 min faster [place]  54% LOS   Static 3x  No hands best 38%    Maze 2x No hands Best 58%    Random control no hands 2 min 63% LOS   Static 3x  No hands best 35%    Maze 3x No hands Best 38%    Random control no hands 2 min 38% LOS   Static 3x  One hand Best 81%, no hands best 39%    Maze 1x One hand Best 92%, no hands best 67%    Random control no hands 67% LOS   Static 3x  One hand Best 68%, no hands best 40%    Maze 1x One hand Best 82%    Random control no hands 4 min 62%   SLS         Wobble board 1 min ea 1 min 30 sec ea 2 min ea     Standing marches     3x10 3#   Standing HS curls     3x10 3#   EOT resisted hip flx   Black TB 3x10 ea     Hs stretch seated 3x30 sec  3x30 sec ea  10 x 10 sec holds   Step up hold  2" step x 10 ea leg 2" step x 10 ea leg No step x 10 ea leg No step x 10 ea leg   Ring toss on foam    X 4 attempts    Heel raises  3x10 3#   3x10 3#   Toe raises  3x10 3#   3x10 3#   Balance on foam X 30 sec tandem, feet together, EC on foam 2 X 30 sec tandem, feet together 2 X 30 sec tandem, feet together                     Re-evaluation        Modalities        Ice

## 2019-11-12 ENCOUNTER — OFFICE VISIT (OUTPATIENT)
Dept: PHYSICAL THERAPY | Facility: CLINIC | Age: 84
End: 2019-11-12
Payer: MEDICARE

## 2019-11-12 DIAGNOSIS — Z91.81 STATUS POST FALL: ICD-10-CM

## 2019-11-12 DIAGNOSIS — R26.89 ABNORMALITY OF GAIT DUE TO IMPAIRMENT OF BALANCE: Primary | ICD-10-CM

## 2019-11-12 PROCEDURE — 97112 NEUROMUSCULAR REEDUCATION: CPT | Performed by: PHYSICAL THERAPIST

## 2019-11-12 PROCEDURE — 97110 THERAPEUTIC EXERCISES: CPT | Performed by: PHYSICAL THERAPIST

## 2019-11-12 NOTE — PROGRESS NOTES
Daily Note     Today's date: 2019  Patient name: Mary Ferreira  : 1935  MRN: [id number]  Referring provider: SHARMAINE Porras  Dx:   Encounter Diagnosis     ICD-10-CM    1  Abnormality of gait due to impairment of balance R26 89    2  Status post fall Z91 81        Start Time: 1045  Stop Time: 1130  Total time in clinic (min): 45 minutes    Subjective: Patient reports she had a bad weekend for her knees, and wishes she could get knee replacements  She feels that the strengthening is helping her do more throughout her day  Objective: See treatment diary below    Assessment: Tolerated treatment fair and required seated rest due to bilateral knee pain during standing strengthening  Patient demonstrated fatigue post treatment, but demonstrates good form for HEP and will likely discharge at re-evaluation next visit  Plan: Progress note during next visit  Potential discharge next visit       Diagnosis: Balance impairment, weakness in latoya hips   Precautions: FALL RISK   Asterisks next to exercises = provided for patient HEP   Manual Therapy 10/30 11/5 11/7 11/12 10/28                                           Exercise Diary  10/30 11/5 11/7 11/12 10/28   Bike 5 min 5 min 5 min 5 min 5 min   Clamshells*        3 way SLR*        TA*        LAQ        LTR        3 way ball rolls        TA with marches        Bridges        Standing hip abd/ext   2# 3x10 latoya 2 5# 3x10 latoya    Mini squats        Step ups        Gait training        Biodex LOS   Static 3x  No hands best 23%    Maze 2x No hands Best 50%    Random control no hands 2 min faster [place]  54% LOS   Static 3x  No hands best 38%    Maze 2x No hands Best 58%    Random control no hands 2 min 63% LOS   Static 3x  No hands best 35%    Maze 3x No hands Best 38%    Random control no hands 2 min 38% LOS   Static 3x  No hands best 37%    Maze 3x No hands Best 10%    Random control no hands 2 min 78% LOS   Static 3x  One hand Best 68%, no hands best 40%    Maze 1x One hand Best 82%    Random control no hands 4 min 62%   SLS         Wobble board 1 min ea 1 min 30 sec ea 2 min ea     Standing marches    3x10 2 5# 3x10 3#   Standing HS curls    3x10 2 5# 3x10 3#   EOT resisted hip flx   Black TB 3x10 ea     Hs stretch seated 3x30 sec  3x30 sec ea  10 x 10 sec holds   Step up hold  2" step x 10 ea leg 2" step x 10 ea leg  No step x 10 ea leg   Ring toss on foam        Heel raises  3x10 3#  3x10 2 5# 3x10 3#   Toe raises  3x10 3#  3x10 2 5# 3x10 3#   Balance on foam X 30 sec tandem, feet together, EC on foam 2 X 30 sec tandem, feet together 2 X 30 sec tandem, feet together                     Re-evaluation        Modalities        Ice

## 2019-11-14 ENCOUNTER — EVALUATION (OUTPATIENT)
Dept: PHYSICAL THERAPY | Facility: CLINIC | Age: 84
End: 2019-11-14
Payer: MEDICARE

## 2019-11-14 DIAGNOSIS — R26.89 ABNORMALITY OF GAIT DUE TO IMPAIRMENT OF BALANCE: Primary | ICD-10-CM

## 2019-11-14 DIAGNOSIS — Z91.81 STATUS POST FALL: ICD-10-CM

## 2019-11-14 PROCEDURE — 97112 NEUROMUSCULAR REEDUCATION: CPT | Performed by: PHYSICAL THERAPIST

## 2019-11-14 NOTE — PROGRESS NOTES
PT Discharge    Today's date: 2019  Patient name: Brenda Velez  : 1935  MRN: [id number]  Referring provider: SHARMAINE Barraza  Dx:   Encounter Diagnosis     ICD-10-CM    1  Abnormality of gait due to impairment of balance R26 89    2  Status post fall Z91 81        Start Time: 1500  Stop Time: 1538  Total time in clinic (min): 38 minutes    Assessment  Assessment details: Brenda Velez has been compliant with attending physical therapy and completing home exercise program since initial evaluation  Rose Marie Pineda  has made improvements in objective data since initial evalulation and has achieved all goals  Patient reports having returned to their prior level of function  Patient provided with updated Home Exercise Program, all questions answered, and verbalized understanding, agreeing to plan of care   Thus it was mutually decided to discontinue this episode of care and transition to Home Exercise Program      Impairments: impaired balance and impaired physical strength  Understanding of Dx/Px/POC: good   Prognosis: good    Goals  Impairment Goals 4-6 weeks  - Increase hip strength to 4/5 throughout - met  - Increase core strength to be able to sit straight up without deviation - met  - Patient will improve mCTSIB by >0 1 - met  - Patient will improve TUG to < 13 seconds to demonstrate decreased risk for falls - met  - Patient will improve 4 step balance test to >30/40 to demonstrate improved static balance - met  - Improve SLS to > 5 seconds on each leg to improve stair climbing - partially met    Functional Goals 6-8 weeks  - Return to Prior Level of Function - met  - Increase Functional Status Measure (FOTO) to: > 61 - met  - Patient will be independent with HEP - met  - Patient will be able to ascend/descend a curb without pain/compensation/difficulty - partially met, modified with HHA  - Patient will be able to perform sit to stand without increased pain/compensation/difficulty - met  - Patient will be able to walk over uneven surfaces without increased pain/compensation/difficulty - met  - Patient will score > 70% on ABC to demonstrate improved confidence during functional activities - partially met      Plan  Planned therapy interventions: home exercise program  Treatment plan discussed with: patient        Subjective Evaluation    History of Present Illness  Mechanism of injury: WORK/SCHOOL: Retired  HOME LIFE: Home with her , 4 ROME[place] + 1 step into house, ranch home, full flight into the basement  Sometimes clumbs these steps with 2 railings  HOBBIES/EXERCISE: Patient enjoys knitting, vincent, cooking, watching the news  Patient does not exercise regularly, enjoys gardening  PLOF: Patient has had recurrent falls    Re-evaluation  HISTORY OF CURRENT INJURY: Patient reports that since last re-evaluation she has been feeling better overall  She has more energy, she has been working a lot in the garden, and is now only limited by her knees  She is aware of her limitations  PAIN LOCATION/DESCRIPTORS: Pain in her knees limit her at times  3/10 aching with use, 0/10 at rest   AGGRAVATING FACTORS:  Descending stairs  Improved: uneven surfaces outdoors, cleaning the house, stairs, curb  EASES: Turning lights on, holding on, walking stick  DAY PATTERN: None  IMAGING:  CT of head showed a finding that patient cannot remember  SPECIAL QUESTIONS:    Chandra Esteves denies a new onset of Dizziness, Dysphagia, Dysarthria, Drop attacks, Diplopia, Nausea, Recent unexplained weight loss, Clumsy or unsteadiness and Constant night pain  Patient has a hx of cancer, breast CA in , now clear  PATIENT GOALS: Patient wishes to improve her balance and strength so that she is able to be more confident in daily activities  - Patient is 90% improved at this time      Pain  Current pain ratin  At best pain ratin  At worst pain rating: 3  Progression: improved    Patient Goals  Patient goals for therapy: improved balance, increased motion, increased strength and independence with ADLs/IADLs          Objective     Strength/Myotome Testing     Left Hip   Planes of Motion   Flexion: 4  External rotation: 4  Internal rotation: 4+    Right Hip   Planes of Motion   Flexion: 4  External rotation: 4  Internal rotation: 4+    Left Knee   Flexion: 4  Extension: 4    Right Knee   Flexion: 4  Extension: 4    Left Ankle/Foot   Dorsiflexion: 4+    Right Ankle/Foot   Dorsiflexion: 4+    General Comments:      Lumbar Comments  Ambulation: Bilateral trendelenberg, short stride, equal stride length  4 step balance test: 37/40   SLS = 7 seconds on L, 4 seconds on R  TU sec  MCTSIB: sway 1 47      Flowsheet Rows      Most Recent Value   PT/OT G-Codes   Current Score  73   Projected Score  61   FOTO information reviewed  Yes              Diagnosis: Balance impairment, weakness in latoya hips   Precautions: FALL RISK   Asterisks next to exercises = provided for patient HEP   Manual Therapy 10/30 11/5 11/7 11/12 11/14                                           Exercise Diary  10/30 11/5 11/7 11/12 11/14   Bike 5 min 5 min 5 min 5 min 5 min   Clamshells*        3 way SLR*        TA*        LAQ        LTR        3 way ball rolls        TA with marches        Bridges        Standing hip abd/ext   2# 3x10 latoya 2 5# 3x10 latoya    Mini squats        Step ups        Gait training        Biodex LOS   Static 3x  No hands best 23%    Maze 2x No hands Best 50%    Random control no hands 2 min faster [place]  54% LOS   Static 3x  No hands best 38%    Maze 2x No hands Best 58%    Random control no hands 2 min 63% LOS   Static 3x  No hands best 35%    Maze 3x No hands Best 38%    Random control no hands 2 min 38% LOS   Static 3x  No hands best 37%    Maze 3x No hands Best 10%    Random control no hands 2 min 78% mCTSIB = 1 47   SLS         Wobble board 1 min ea 1 min 30 sec ea 2 min ea     Standing marches    3x10 2 5#    Standing HS curls    3x10 2 5#    EOT resisted hip flx Black TB 3x10 ea     Hs stretch seated 3x30 sec  3x30 sec ea     Step up hold  2" step x 10 ea leg 2" step x 10 ea leg     Ring toss on foam        Heel raises  3x10 3#  3x10 2 5#    Toe raises  3x10 3#  3x10 2 5#    Balance on foam X 30 sec tandem, feet together, EC on foam 2 X 30 sec tandem, feet together 2 X 30 sec tandem, feet together                     Re-evaluation     Performed DC, HEP, FOTO   Modalities        Ice

## 2019-11-20 ENCOUNTER — APPOINTMENT (OUTPATIENT)
Dept: LAB | Facility: CLINIC | Age: 84
End: 2019-11-20
Payer: MEDICARE

## 2019-11-20 ENCOUNTER — ANTICOAG VISIT (OUTPATIENT)
Dept: CARDIOLOGY CLINIC | Facility: CLINIC | Age: 84
End: 2019-11-20

## 2019-11-20 DIAGNOSIS — I48.91 ATRIAL FIBRILLATION, UNSPECIFIED TYPE (HCC): ICD-10-CM

## 2020-01-15 ENCOUNTER — ANTICOAG VISIT (OUTPATIENT)
Dept: CARDIOLOGY CLINIC | Facility: CLINIC | Age: 85
End: 2020-01-15

## 2020-01-15 DIAGNOSIS — I48.21 PERMANENT ATRIAL FIBRILLATION (HCC): ICD-10-CM
